# Patient Record
Sex: FEMALE | Race: OTHER | Employment: FULL TIME | URBAN - METROPOLITAN AREA
[De-identification: names, ages, dates, MRNs, and addresses within clinical notes are randomized per-mention and may not be internally consistent; named-entity substitution may affect disease eponyms.]

---

## 2017-03-13 ENCOUNTER — HOSPITAL ENCOUNTER (OUTPATIENT)
Age: 32
Discharge: HOME | End: 2017-03-13
Attending: FAMILY MEDICINE
Payer: COMMERCIAL

## 2017-03-13 VITALS
HEART RATE: 65 BPM | SYSTOLIC BLOOD PRESSURE: 146 MMHG | DIASTOLIC BLOOD PRESSURE: 88 MMHG | RESPIRATION RATE: 18 BRPM | WEIGHT: 243 LBS | OXYGEN SATURATION: 98 % | HEIGHT: 62 IN | BODY MASS INDEX: 44.72 KG/M2 | TEMPERATURE: 98 F

## 2017-03-13 DIAGNOSIS — R07.0 PAIN IN THROAT: ICD-10-CM

## 2017-03-13 DIAGNOSIS — J06.9 VIRAL UPPER RESPIRATORY TRACT INFECTION: Primary | ICD-10-CM

## 2017-03-13 PROCEDURE — 99203 OFFICE O/P NEW LOW 30 MIN: CPT

## 2017-03-13 PROCEDURE — 87430 STREP A AG IA: CPT

## 2017-03-13 PROCEDURE — 99204 OFFICE O/P NEW MOD 45 MIN: CPT

## 2017-03-13 RX ORDER — CODEINE PHOSPHATE AND GUAIFENESIN 10; 100 MG/5ML; MG/5ML
10 SOLUTION ORAL NIGHTLY PRN
Qty: 118 ML | Refills: 0 | Status: SHIPPED | OUTPATIENT
Start: 2017-03-13

## 2017-03-13 NOTE — ED PROVIDER NOTES
Patient Seen in: [address]    History   Patient presents with:  Sore Throat    Stated Complaint: Sore Throat    HPI    Patient here with sore throat for 3 days. Pain with swallowing.  Was recently in [place] and exposed t frontal sinus pressure. NECK: supple, no cervical LAD  THROAT: MMM noted, post phaynx injected, tonsils are symmetrical with mild enlargement and erythema. No exudate.     LUNGS: b/l CTA, good air entry  CARDIO: RRR without murmur  EXTREMITIES: no cyanosi

## 2017-03-13 NOTE — ED INITIAL ASSESSMENT (HPI)
Reports since Friday has had sore throat. Since yesterday worse sore throat felt hot denies fevers.  Also reports congestion and sinus pressure

## 2017-03-14 LAB — S PYO AG THROAT QL: NEGATIVE

## 2020-08-17 ENCOUNTER — HOSPITAL (OUTPATIENT)
Dept: OTHER | Age: 35
End: 2020-08-17
Attending: FAMILY MEDICINE

## 2020-09-23 ENCOUNTER — HOSPITAL (OUTPATIENT)
Dept: OTHER | Age: 35
End: 2020-09-23
Attending: EMERGENCY MEDICINE

## 2020-09-23 PROCEDURE — 99283 EMERGENCY DEPT VISIT LOW MDM: CPT | Performed by: FAMILY MEDICINE

## 2021-04-02 ENCOUNTER — HOSPITAL ENCOUNTER (OUTPATIENT)
Dept: CT IMAGING | Age: 36
Discharge: HOME | End: 2021-04-02
Attending: PLASTIC SURGERY

## 2021-04-02 DIAGNOSIS — R10.9 ABDOMINAL PAIN: ICD-10-CM

## 2021-04-02 PROCEDURE — 74177 CT ABD & PELVIS W/CONTRAST: CPT

## 2021-04-02 PROCEDURE — 10002805 HB CONTRAST AGENT: Performed by: PLASTIC SURGERY

## 2021-04-02 RX ADMIN — IOHEXOL 100 ML: 300 INJECTION, SOLUTION INTRAVENOUS at 15:22

## 2021-04-26 ENCOUNTER — OFFICE VISIT (OUTPATIENT)
Dept: INTERNAL MEDICINE | Age: 36
End: 2021-04-26

## 2021-04-26 DIAGNOSIS — M79.89 SOFT TISSUE MASS: ICD-10-CM

## 2021-04-26 DIAGNOSIS — R10.11 RIGHT UPPER QUADRANT ABDOMINAL PAIN: ICD-10-CM

## 2021-04-26 DIAGNOSIS — Z00.00 HEALTH MAINTENANCE EXAMINATION: Primary | ICD-10-CM

## 2021-04-26 DIAGNOSIS — E66.01 OBESITY, MORBID, BMI 40.0-49.9 (CMD): ICD-10-CM

## 2021-04-26 PROCEDURE — 99385 PREV VISIT NEW AGE 18-39: CPT | Performed by: INTERNAL MEDICINE

## 2021-04-26 RX ORDER — PANTOPRAZOLE SODIUM 40 MG/1
40 TABLET, DELAYED RELEASE ORAL DAILY
COMMUNITY
Start: 2019-10-28

## 2021-04-26 RX ORDER — MELOXICAM 10 MG/1
CAPSULE ORAL
COMMUNITY

## 2021-04-26 RX ORDER — ESTRADIOL 0.5 MG/1
1 TABLET ORAL DAILY
COMMUNITY
Start: 2019-10-28 | End: 2022-02-24 | Stop reason: ALTCHOICE

## 2021-04-26 ASSESSMENT — PATIENT HEALTH QUESTIONNAIRE - PHQ9
1. LITTLE INTEREST OR PLEASURE IN DOING THINGS: NOT AT ALL
CLINICAL INTERPRETATION OF PHQ9 SCORE: NO FURTHER SCREENING NEEDED
SUM OF ALL RESPONSES TO PHQ9 QUESTIONS 1 AND 2: 0
SUM OF ALL RESPONSES TO PHQ9 QUESTIONS 1 AND 2: 0
CLINICAL INTERPRETATION OF PHQ2 SCORE: NO FURTHER SCREENING NEEDED
2. FEELING DOWN, DEPRESSED OR HOPELESS: NOT AT ALL

## 2021-04-26 ASSESSMENT — PAIN SCALES - GENERAL: PAINLEVEL: 0

## 2021-04-26 ASSESSMENT — ENCOUNTER SYMPTOMS
RESPIRATORY NEGATIVE: 1
CONSTITUTIONAL NEGATIVE: 1

## 2021-05-03 ENCOUNTER — LAB SERVICES (OUTPATIENT)
Dept: LAB | Age: 36
End: 2021-05-03

## 2021-05-03 DIAGNOSIS — R10.11 RIGHT UPPER QUADRANT ABDOMINAL PAIN: ICD-10-CM

## 2021-05-03 DIAGNOSIS — Z00.00 HEALTH MAINTENANCE EXAMINATION: ICD-10-CM

## 2021-05-03 LAB
ALBUMIN SERPL-MCNC: 3.3 G/DL (ref 3.6–5.1)
ALBUMIN/GLOB SERPL: 0.9 {RATIO} (ref 1–2.4)
ALP SERPL-CCNC: 71 UNITS/L (ref 45–117)
ALT SERPL-CCNC: 27 UNITS/L
ANION GAP SERPL CALC-SCNC: 13 MMOL/L (ref 10–20)
AST SERPL-CCNC: 15 UNITS/L
BASOPHILS # BLD: 0 K/MCL (ref 0–0.3)
BASOPHILS NFR BLD: 0 %
BILIRUB SERPL-MCNC: 0.3 MG/DL (ref 0.2–1)
BUN SERPL-MCNC: 23 MG/DL (ref 6–20)
BUN/CREAT SERPL: 31 (ref 7–25)
CALCIUM SERPL-MCNC: 8.6 MG/DL (ref 8.4–10.2)
CHLORIDE SERPL-SCNC: 105 MMOL/L (ref 98–107)
CHOLEST SERPL-MCNC: 186 MG/DL
CHOLEST/HDLC SERPL: 3.2 {RATIO}
CO2 SERPL-SCNC: 27 MMOL/L (ref 21–32)
CREAT SERPL-MCNC: 0.75 MG/DL (ref 0.51–0.95)
DEPRECATED RDW RBC: 48 FL (ref 39–50)
EOSINOPHIL # BLD: 0.1 K/MCL (ref 0–0.5)
EOSINOPHIL NFR BLD: 2 %
ERYTHROCYTE [DISTWIDTH] IN BLOOD: 17.9 % (ref 11–15)
FASTING DURATION TIME PATIENT: ABNORMAL H
FASTING DURATION TIME PATIENT: NORMAL H
GFR SERPLBLD BASED ON 1.73 SQ M-ARVRAT: >90 ML/MIN/1.73M2
GLOBULIN SER-MCNC: 3.8 G/DL (ref 2–4)
GLUCOSE SERPL-MCNC: 85 MG/DL (ref 65–99)
HBA1C MFR BLD: 5.4 % (ref 4.5–5.6)
HCT VFR BLD CALC: 39.3 % (ref 36–46.5)
HDLC SERPL-MCNC: 58 MG/DL
HGB BLD-MCNC: 11.8 G/DL (ref 12–15.5)
IMM GRANULOCYTES # BLD AUTO: 0 K/MCL (ref 0–0.2)
IMM GRANULOCYTES # BLD: 0 %
LDLC SERPL CALC-MCNC: 109 MG/DL
LYMPHOCYTES # BLD: 1.9 K/MCL (ref 1–4.8)
LYMPHOCYTES NFR BLD: 24 %
MCH RBC QN AUTO: 22.8 PG (ref 26–34)
MCHC RBC AUTO-ENTMCNC: 30 G/DL (ref 32–36.5)
MCV RBC AUTO: 75.9 FL (ref 78–100)
MONOCYTES # BLD: 0.5 K/MCL (ref 0.3–0.9)
MONOCYTES NFR BLD: 6 %
NEUTROPHILS # BLD: 5.6 K/MCL (ref 1.8–7.7)
NEUTROPHILS NFR BLD: 68 %
NONHDLC SERPL-MCNC: 128 MG/DL
NRBC BLD MANUAL-RTO: 0 /100 WBC
PLATELET # BLD AUTO: 288 K/MCL (ref 140–450)
POTASSIUM SERPL-SCNC: 4.4 MMOL/L (ref 3.4–5.1)
PROT SERPL-MCNC: 7.1 G/DL (ref 6.4–8.2)
RBC # BLD: 5.18 MIL/MCL (ref 4–5.2)
SODIUM SERPL-SCNC: 141 MMOL/L (ref 135–145)
TRIGL SERPL-MCNC: 93 MG/DL
TSH SERPL-ACNC: 1.71 MCUNITS/ML (ref 0.35–5)
WBC # BLD: 8.2 K/MCL (ref 4.2–11)

## 2021-05-03 PROCEDURE — 36415 COLL VENOUS BLD VENIPUNCTURE: CPT | Performed by: INTERNAL MEDICINE

## 2021-05-03 PROCEDURE — 80050 GENERAL HEALTH PANEL: CPT | Performed by: INTERNAL MEDICINE

## 2021-05-03 PROCEDURE — 80061 LIPID PANEL: CPT | Performed by: INTERNAL MEDICINE

## 2021-05-03 PROCEDURE — 83036 HEMOGLOBIN GLYCOSYLATED A1C: CPT | Performed by: INTERNAL MEDICINE

## 2021-05-06 ENCOUNTER — HOSPITAL ENCOUNTER (OUTPATIENT)
Dept: ULTRASOUND IMAGING | Age: 36
Discharge: HOME | End: 2021-05-06
Attending: INTERNAL MEDICINE

## 2021-05-06 DIAGNOSIS — M79.89 SOFT TISSUE MASS: ICD-10-CM

## 2021-05-06 PROCEDURE — 76705 ECHO EXAM OF ABDOMEN: CPT

## 2021-05-25 VITALS
WEIGHT: 245 LBS | DIASTOLIC BLOOD PRESSURE: 73 MMHG | HEIGHT: 62 IN | BODY MASS INDEX: 45.08 KG/M2 | RESPIRATION RATE: 18 BRPM | TEMPERATURE: 97.9 F | HEART RATE: 69 BPM | SYSTOLIC BLOOD PRESSURE: 124 MMHG

## 2021-10-14 ENCOUNTER — TELEPHONE (OUTPATIENT)
Dept: SCHEDULING | Age: 36
End: 2021-10-14

## 2021-12-09 ENCOUNTER — OFFICE VISIT (OUTPATIENT)
Dept: INTERNAL MEDICINE | Age: 36
End: 2021-12-09

## 2021-12-09 VITALS
WEIGHT: 238 LBS | TEMPERATURE: 98.1 F | BODY MASS INDEX: 43.79 KG/M2 | SYSTOLIC BLOOD PRESSURE: 116 MMHG | DIASTOLIC BLOOD PRESSURE: 78 MMHG | OXYGEN SATURATION: 97 % | RESPIRATION RATE: 18 BRPM | HEART RATE: 70 BPM | HEIGHT: 62 IN

## 2021-12-09 DIAGNOSIS — R60.9 EDEMA, UNSPECIFIED TYPE: Primary | ICD-10-CM

## 2021-12-09 DIAGNOSIS — L30.9 DERMATITIS: ICD-10-CM

## 2021-12-09 PROCEDURE — 99214 OFFICE O/P EST MOD 30 MIN: CPT | Performed by: INTERNAL MEDICINE

## 2021-12-09 RX ORDER — ESTRADIOL 1 MG/1
1 TABLET ORAL DAILY
COMMUNITY
Start: 2021-09-11

## 2021-12-09 RX ORDER — PHENTERMINE HYDROCHLORIDE 37.5 MG/1
CAPSULE ORAL
COMMUNITY
Start: 2021-11-01

## 2021-12-09 RX ORDER — TRIAMCINOLONE ACETONIDE 1 MG/G
CREAM TOPICAL 2 TIMES DAILY
Qty: 30 G | Refills: 1 | Status: SHIPPED | OUTPATIENT
Start: 2021-12-09

## 2021-12-09 RX ORDER — HYDROCHLOROTHIAZIDE 12.5 MG/1
TABLET ORAL
Qty: 30 TABLET | Refills: 3 | Status: SHIPPED | OUTPATIENT
Start: 2021-12-09

## 2021-12-09 ASSESSMENT — PATIENT HEALTH QUESTIONNAIRE - PHQ9
2. FEELING DOWN, DEPRESSED OR HOPELESS: NOT AT ALL
SUM OF ALL RESPONSES TO PHQ9 QUESTIONS 1 AND 2: 0
1. LITTLE INTEREST OR PLEASURE IN DOING THINGS: NOT AT ALL
CLINICAL INTERPRETATION OF PHQ2 SCORE: NO FURTHER SCREENING NEEDED
SUM OF ALL RESPONSES TO PHQ9 QUESTIONS 1 AND 2: 0

## 2021-12-13 ASSESSMENT — ENCOUNTER SYMPTOMS
CONSTITUTIONAL NEGATIVE: 1
RESPIRATORY NEGATIVE: 1
GASTROINTESTINAL NEGATIVE: 1

## 2022-01-25 PROBLEM — M41.126 ADOLESCENT IDIOPATHIC SCOLIOSIS OF LUMBAR REGION: Status: ACTIVE | Noted: 2022-01-25

## 2022-01-25 PROBLEM — M54.16 LEFT LUMBAR RADICULOPATHY: Status: ACTIVE | Noted: 2022-01-25

## 2022-01-25 PROBLEM — M21.70 LEG LENGTH DISCREPANCY: Status: ACTIVE | Noted: 2022-01-25

## 2022-01-25 PROBLEM — M79.18 MYALGIA, OTHER SITE: Status: ACTIVE | Noted: 2022-01-25

## 2022-02-07 ENCOUNTER — TELEPHONE (OUTPATIENT)
Dept: TELEHEALTH | Age: 37
End: 2022-02-07

## 2022-02-07 ENCOUNTER — V-VISIT (OUTPATIENT)
Dept: FAMILY MEDICINE | Age: 37
End: 2022-02-07

## 2022-02-07 ENCOUNTER — APPOINTMENT (OUTPATIENT)
Dept: GENERAL RADIOLOGY | Age: 37
End: 2022-02-07
Attending: EMERGENCY MEDICINE

## 2022-02-07 ENCOUNTER — HOSPITAL ENCOUNTER (EMERGENCY)
Age: 37
Discharge: HOME | End: 2022-02-07
Attending: EMERGENCY MEDICINE

## 2022-02-07 VITALS
OXYGEN SATURATION: 100 % | RESPIRATION RATE: 16 BRPM | HEIGHT: 62 IN | TEMPERATURE: 97.9 F | DIASTOLIC BLOOD PRESSURE: 70 MMHG | SYSTOLIC BLOOD PRESSURE: 118 MMHG | WEIGHT: 240 LBS | BODY MASS INDEX: 44.16 KG/M2 | HEART RATE: 86 BPM

## 2022-02-07 DIAGNOSIS — R69 DIAGNOSIS DEFERRED: Primary | ICD-10-CM

## 2022-02-07 DIAGNOSIS — R42 VERTIGO: Primary | ICD-10-CM

## 2022-02-07 PROBLEM — M19.90 ARTHRITIS: Status: ACTIVE | Noted: 2022-02-07

## 2022-02-07 PROBLEM — E66.01 MORBID OBESITY (CMD): Status: ACTIVE | Noted: 2022-02-07

## 2022-02-07 LAB
ALBUMIN SERPL-MCNC: 3.5 G/DL (ref 3.6–5.1)
ALBUMIN/GLOB SERPL: 0.9 {RATIO} (ref 1–2.4)
ALP SERPL-CCNC: 83 UNITS/L (ref 45–117)
ALT SERPL-CCNC: 27 UNITS/L
ANION GAP SERPL CALC-SCNC: 13 MMOL/L (ref 10–20)
AST SERPL-CCNC: 15 UNITS/L
ATRIAL RATE (BPM): 75
BASOPHILS # BLD: 0 K/MCL (ref 0–0.3)
BASOPHILS NFR BLD: 0 %
BILIRUB SERPL-MCNC: 0.2 MG/DL (ref 0.2–1)
BUN SERPL-MCNC: 18 MG/DL (ref 6–20)
BUN/CREAT SERPL: 25 (ref 7–25)
CALCIUM SERPL-MCNC: 9.2 MG/DL (ref 8.4–10.2)
CHLORIDE SERPL-SCNC: 103 MMOL/L (ref 98–107)
CO2 SERPL-SCNC: 28 MMOL/L (ref 21–32)
CREAT SERPL-MCNC: 0.73 MG/DL (ref 0.51–0.95)
DEPRECATED RDW RBC: 39.2 FL (ref 39–50)
EOSINOPHIL # BLD: 0.1 K/MCL (ref 0–0.5)
EOSINOPHIL NFR BLD: 1 %
ERYTHROCYTE [DISTWIDTH] IN BLOOD: 12.9 % (ref 11–15)
FASTING DURATION TIME PATIENT: ABNORMAL H
FLUAV RNA RESP QL NAA+PROBE: NOT DETECTED
FLUBV RNA RESP QL NAA+PROBE: NOT DETECTED
GFR SERPLBLD BASED ON 1.73 SQ M-ARVRAT: >90 ML/MIN
GLOBULIN SER-MCNC: 3.8 G/DL (ref 2–4)
GLUCOSE SERPL-MCNC: 81 MG/DL (ref 70–99)
HCT VFR BLD CALC: 43.1 % (ref 36–46.5)
HGB BLD-MCNC: 13.9 G/DL (ref 12–15.5)
IMM GRANULOCYTES # BLD AUTO: 0 K/MCL (ref 0–0.2)
IMM GRANULOCYTES # BLD: 0 %
LYMPHOCYTES # BLD: 1.9 K/MCL (ref 1–4.8)
LYMPHOCYTES NFR BLD: 31 %
MCH RBC QN AUTO: 27.1 PG (ref 26–34)
MCHC RBC AUTO-ENTMCNC: 32.3 G/DL (ref 32–36.5)
MCV RBC AUTO: 84.2 FL (ref 78–100)
MONOCYTES # BLD: 0.3 K/MCL (ref 0.3–0.9)
MONOCYTES NFR BLD: 6 %
NEUTROPHILS # BLD: 3.9 K/MCL (ref 1.8–7.7)
NEUTROPHILS NFR BLD: 62 %
NRBC BLD MANUAL-RTO: 0 /100 WBC
P AXIS (DEGREES): 61
PLATELET # BLD AUTO: 287 K/MCL (ref 140–450)
POTASSIUM SERPL-SCNC: 4.1 MMOL/L (ref 3.4–5.1)
PR-INTERVAL (MSEC): 160
PROT SERPL-MCNC: 7.3 G/DL (ref 6.4–8.2)
QRS-INTERVAL (MSEC): 98
QT-INTERVAL (MSEC): 376
QTC: 420
R AXIS (DEGREES): 48
RBC # BLD: 5.12 MIL/MCL (ref 4–5.2)
REPORT TEXT: NORMAL
RSV AG NPH QL IA.RAPID: NOT DETECTED
SARS-COV-2 RNA RESP QL NAA+PROBE: NOT DETECTED
SERVICE CMNT-IMP: NORMAL
SERVICE CMNT-IMP: NORMAL
SODIUM SERPL-SCNC: 140 MMOL/L (ref 135–145)
T AXIS (DEGREES): 26
TROPONIN I SERPL DL<=0.01 NG/ML-MCNC: 6 NG/L
TROPONIN I SERPL DL<=0.01 NG/ML-MCNC: 7 NG/L
VENTRICULAR RATE EKG/MIN (BPM): 75
WBC # BLD: 6.2 K/MCL (ref 4.2–11)

## 2022-02-07 PROCEDURE — 84484 ASSAY OF TROPONIN QUANT: CPT | Performed by: EMERGENCY MEDICINE

## 2022-02-07 PROCEDURE — C9803 HOPD COVID-19 SPEC COLLECT: HCPCS

## 2022-02-07 PROCEDURE — 99285 EMERGENCY DEPT VISIT HI MDM: CPT | Performed by: EMERGENCY MEDICINE

## 2022-02-07 PROCEDURE — 71046 X-RAY EXAM CHEST 2 VIEWS: CPT

## 2022-02-07 PROCEDURE — 0241U COVID/FLU/RSV PANEL: CPT | Performed by: EMERGENCY MEDICINE

## 2022-02-07 PROCEDURE — 10002803 HB RX 637: Performed by: EMERGENCY MEDICINE

## 2022-02-07 PROCEDURE — 93005 ELECTROCARDIOGRAM TRACING: CPT | Performed by: EMERGENCY MEDICINE

## 2022-02-07 PROCEDURE — 93010 ELECTROCARDIOGRAM REPORT: CPT | Performed by: INTERNAL MEDICINE

## 2022-02-07 PROCEDURE — 85025 COMPLETE CBC W/AUTO DIFF WBC: CPT | Performed by: EMERGENCY MEDICINE

## 2022-02-07 PROCEDURE — 80053 COMPREHEN METABOLIC PANEL: CPT | Performed by: EMERGENCY MEDICINE

## 2022-02-07 PROCEDURE — 36415 COLL VENOUS BLD VENIPUNCTURE: CPT

## 2022-02-07 PROCEDURE — 99284 EMERGENCY DEPT VISIT MOD MDM: CPT

## 2022-02-07 RX ORDER — MECLIZINE HYDROCHLORIDE 25 MG/1
25 TABLET ORAL ONCE
Status: COMPLETED | OUTPATIENT
Start: 2022-02-07 | End: 2022-02-07

## 2022-02-07 RX ORDER — MECLIZINE HYDROCHLORIDE 25 MG/1
25 TABLET ORAL 3 TIMES DAILY PRN
Qty: 15 TABLET | Refills: 0 | Status: SHIPPED | OUTPATIENT
Start: 2022-02-07 | End: 2022-02-19 | Stop reason: SDUPTHER

## 2022-02-07 RX ADMIN — MECLIZINE HYDROCHLORIDE 25 MG: 25 TABLET ORAL at 20:38

## 2022-02-07 ASSESSMENT — ENCOUNTER SYMPTOMS
WEAKNESS: 0
EYE REDNESS: 0
CHILLS: 0
EYE PAIN: 0
SHORTNESS OF BREATH: 0
HEADACHES: 1
BRUISES/BLEEDS EASILY: 0
FEVER: 0
FATIGUE: 0
VOMITING: 0
SPEECH DIFFICULTY: 0
SORE THROAT: 0
BACK PAIN: 0
POLYDIPSIA: 0
CONSTIPATION: 0
DIAPHORESIS: 0
ABDOMINAL PAIN: 0
COUGH: 0
NUMBNESS: 0
NAUSEA: 0
DIZZINESS: 1
DIARRHEA: 0
RHINORRHEA: 0
TREMORS: 0

## 2022-02-07 ASSESSMENT — PAIN SCALES - GENERAL
PAINLEVEL_OUTOF10: 0
PAINLEVEL_OUTOF10: 5

## 2022-02-19 ENCOUNTER — OFFICE VISIT (OUTPATIENT)
Dept: URGENT CARE | Age: 37
End: 2022-02-19

## 2022-02-19 VITALS
TEMPERATURE: 97.9 F | DIASTOLIC BLOOD PRESSURE: 93 MMHG | HEART RATE: 99 BPM | RESPIRATION RATE: 20 BRPM | SYSTOLIC BLOOD PRESSURE: 131 MMHG | OXYGEN SATURATION: 98 %

## 2022-02-19 DIAGNOSIS — R42 VERTIGO: ICD-10-CM

## 2022-02-19 DIAGNOSIS — S00.411A ABRASION OF RIGHT EAR CANAL, INITIAL ENCOUNTER: Primary | ICD-10-CM

## 2022-02-19 DIAGNOSIS — H61.21 IMPACTED CERUMEN OF RIGHT EAR: ICD-10-CM

## 2022-02-19 PROCEDURE — 99213 OFFICE O/P EST LOW 20 MIN: CPT | Performed by: NURSE PRACTITIONER

## 2022-02-19 RX ORDER — OFLOXACIN 3 MG/ML
10 SOLUTION AURICULAR (OTIC) 2 TIMES DAILY
Qty: 7 ML | Refills: 0 | Status: SHIPPED | OUTPATIENT
Start: 2022-02-19 | End: 2022-02-26

## 2022-02-19 RX ORDER — MECLIZINE HYDROCHLORIDE 25 MG/1
25 TABLET ORAL 2 TIMES DAILY PRN
Qty: 10 TABLET | Refills: 0 | Status: SHIPPED | OUTPATIENT
Start: 2022-02-19 | End: 2022-02-24

## 2022-02-19 ASSESSMENT — PAIN SCALES - GENERAL: PAINLEVEL: 8

## 2022-02-19 ASSESSMENT — ENCOUNTER SYMPTOMS
CHILLS: 0
FATIGUE: 1
FEVER: 0
DIZZINESS: 1
WEAKNESS: 0
GASTROINTESTINAL NEGATIVE: 1
SORE THROAT: 0
RHINORRHEA: 0
TROUBLE SWALLOWING: 0
EYES NEGATIVE: 1
NUMBNESS: 0
RESPIRATORY NEGATIVE: 1
SPEECH DIFFICULTY: 0

## 2022-02-23 ENCOUNTER — OFFICE VISIT (OUTPATIENT)
Dept: INTERNAL MEDICINE | Age: 37
End: 2022-02-23

## 2022-02-23 VITALS
RESPIRATION RATE: 16 BRPM | SYSTOLIC BLOOD PRESSURE: 113 MMHG | TEMPERATURE: 98.2 F | DIASTOLIC BLOOD PRESSURE: 69 MMHG | HEART RATE: 87 BPM | OXYGEN SATURATION: 98 % | BODY MASS INDEX: 45.27 KG/M2 | WEIGHT: 246 LBS | HEIGHT: 62 IN

## 2022-02-23 DIAGNOSIS — J34.89 SINUS PRESSURE: ICD-10-CM

## 2022-02-23 DIAGNOSIS — K52.9 GASTROENTERITIS: ICD-10-CM

## 2022-02-23 DIAGNOSIS — H81.10 BENIGN PAROXYSMAL POSITIONAL VERTIGO, UNSPECIFIED LATERALITY: Primary | ICD-10-CM

## 2022-02-23 PROCEDURE — 99213 OFFICE O/P EST LOW 20 MIN: CPT | Performed by: INTERNAL MEDICINE

## 2022-02-23 RX ORDER — PSEUDOEPHEDRINE HCL 120 MG/1
120 TABLET, FILM COATED, EXTENDED RELEASE ORAL EVERY 12 HOURS
Qty: 30 TABLET | Refills: 0 | Status: SHIPPED | OUTPATIENT
Start: 2022-02-23

## 2022-02-23 ASSESSMENT — PATIENT HEALTH QUESTIONNAIRE - PHQ9
SUM OF ALL RESPONSES TO PHQ9 QUESTIONS 1 AND 2: 0
2. FEELING DOWN, DEPRESSED OR HOPELESS: NOT AT ALL
CLINICAL INTERPRETATION OF PHQ2 SCORE: NO FURTHER SCREENING NEEDED
SUM OF ALL RESPONSES TO PHQ9 QUESTIONS 1 AND 2: 0
1. LITTLE INTEREST OR PLEASURE IN DOING THINGS: NOT AT ALL

## 2022-02-23 ASSESSMENT — PAIN SCALES - GENERAL: PAINLEVEL: 6

## 2022-02-24 ASSESSMENT — ENCOUNTER SYMPTOMS
NAUSEA: 1
RESPIRATORY NEGATIVE: 1
SINUS PRESSURE: 1
BLOOD IN STOOL: 0
FEVER: 0
ABDOMINAL PAIN: 1
CONSTITUTIONAL NEGATIVE: 1

## 2022-03-03 ENCOUNTER — EXTERNAL RECORD (OUTPATIENT)
Dept: HEALTH INFORMATION MANAGEMENT | Facility: OTHER | Age: 37
End: 2022-03-03

## 2022-03-09 ENCOUNTER — EXTERNAL RECORD (OUTPATIENT)
Dept: HEALTH INFORMATION MANAGEMENT | Facility: OTHER | Age: 37
End: 2022-03-09

## 2022-03-18 ENCOUNTER — WALK IN (OUTPATIENT)
Dept: URGENT CARE | Age: 37
End: 2022-03-18

## 2022-03-18 VITALS
BODY MASS INDEX: 46.38 KG/M2 | RESPIRATION RATE: 20 BRPM | HEART RATE: 76 BPM | OXYGEN SATURATION: 97 % | HEIGHT: 62 IN | SYSTOLIC BLOOD PRESSURE: 108 MMHG | DIASTOLIC BLOOD PRESSURE: 77 MMHG | TEMPERATURE: 97.3 F | WEIGHT: 252 LBS

## 2022-03-18 DIAGNOSIS — M26.609 TMJ (TEMPOROMANDIBULAR JOINT DISORDER): Primary | ICD-10-CM

## 2022-03-18 DIAGNOSIS — M79.18 MYOFASCIAL PAIN: ICD-10-CM

## 2022-03-18 PROBLEM — M53.9 BACK DISORDER: Status: ACTIVE | Noted: 2022-03-18

## 2022-03-18 PROBLEM — G50.1 ATYPICAL FACIAL PAIN: Status: ACTIVE | Noted: 2022-03-18

## 2022-03-18 PROBLEM — M79.2 NEURALGIA: Status: ACTIVE | Noted: 2022-03-18

## 2022-03-18 PROBLEM — M21.70 LEG LENGTH DISCREPANCY: Status: ACTIVE | Noted: 2022-01-25

## 2022-03-18 PROBLEM — M41.126 ADOLESCENT IDIOPATHIC SCOLIOSIS OF LUMBAR REGION: Status: ACTIVE | Noted: 2022-01-25

## 2022-03-18 PROBLEM — M54.50 CHRONIC LOWER BACK PAIN: Status: ACTIVE | Noted: 2022-03-18

## 2022-03-18 PROBLEM — M41.9 SCOLIOSIS: Status: ACTIVE | Noted: 2022-03-18

## 2022-03-18 PROBLEM — G89.29 CHRONIC LOWER BACK PAIN: Status: ACTIVE | Noted: 2022-03-18

## 2022-03-18 PROBLEM — M54.16 LEFT LUMBAR RADICULOPATHY: Status: ACTIVE | Noted: 2022-01-25

## 2022-03-18 LAB
INTERNAL PROCEDURAL CONTROLS ACCEPTABLE: YES
S PYO AG THROAT QL IA.RAPID: NEGATIVE
SARS-COV+SARS-COV-2 AG RESP QL IA.RAPID: NOT DETECTED

## 2022-03-18 PROCEDURE — 87426 SARSCOV CORONAVIRUS AG IA: CPT | Performed by: NURSE PRACTITIONER

## 2022-03-18 PROCEDURE — 99213 OFFICE O/P EST LOW 20 MIN: CPT | Performed by: NURSE PRACTITIONER

## 2022-03-18 PROCEDURE — 87880 STREP A ASSAY W/OPTIC: CPT | Performed by: NURSE PRACTITIONER

## 2022-03-18 RX ORDER — CYCLOBENZAPRINE HCL 10 MG
10 TABLET ORAL
Qty: 30 TABLET | Refills: 2 | Status: SHIPPED | OUTPATIENT
Start: 2022-03-18

## 2022-03-18 ASSESSMENT — ENCOUNTER SYMPTOMS
SHORTNESS OF BREATH: 0
RHINORRHEA: 0
DIZZINESS: 0
LIGHT-HEADEDNESS: 0
NUMBNESS: 0
FACIAL SWELLING: 1
ABDOMINAL PAIN: 0
SORE THROAT: 0
ADENOPATHY: 0
VOICE CHANGE: 0
FATIGUE: 0
FEVER: 0
DIAPHORESIS: 0
FACIAL ASYMMETRY: 0
SINUS PRESSURE: 0
STRIDOR: 0
EYE PAIN: 0
CHILLS: 0
TROUBLE SWALLOWING: 0
HEADACHES: 0
SINUS PAIN: 0

## 2022-07-12 ENCOUNTER — APPOINTMENT (OUTPATIENT)
Dept: URGENT CARE | Age: 37
End: 2022-07-12

## 2022-07-12 ENCOUNTER — TELEPHONE (OUTPATIENT)
Dept: URGENT CARE | Age: 37
End: 2022-07-12

## 2022-09-30 ENCOUNTER — APPOINTMENT (OUTPATIENT)
Dept: URBAN - METROPOLITAN AREA CLINIC 317 | Age: 37
Setting detail: DERMATOLOGY
End: 2022-09-30

## 2022-09-30 DIAGNOSIS — L20.89 OTHER ATOPIC DERMATITIS: ICD-10-CM

## 2022-09-30 DIAGNOSIS — L65.9 NONSCARRING HAIR LOSS, UNSPECIFIED: ICD-10-CM

## 2022-09-30 PROBLEM — L20.84 INTRINSIC (ALLERGIC) ECZEMA: Status: ACTIVE | Noted: 2022-09-30

## 2022-09-30 PROCEDURE — OTHER ORDER TESTS: OTHER

## 2022-09-30 PROCEDURE — OTHER COUNSELING: OTHER

## 2022-09-30 PROCEDURE — 99204 OFFICE O/P NEW MOD 45 MIN: CPT

## 2022-09-30 PROCEDURE — OTHER PRESCRIPTION MEDICATION MANAGEMENT: OTHER

## 2022-09-30 PROCEDURE — OTHER PRESCRIPTION: OTHER

## 2022-09-30 PROCEDURE — OTHER MIPS QUALITY: OTHER

## 2022-09-30 RX ORDER — TRIAMCINOLONE ACETONIDE 1 MG/G
CREAM TOPICAL BID
Qty: 80 | Refills: 0 | Status: ERX | COMMUNITY
Start: 2022-09-30

## 2022-09-30 ASSESSMENT — LOCATION ZONE DERM
LOCATION ZONE: TRUNK
LOCATION ZONE: SCALP

## 2022-09-30 ASSESSMENT — LOCATION DETAILED DESCRIPTION DERM
LOCATION DETAILED: MIDDLE STERNUM
LOCATION DETAILED: RIGHT MEDIAL FRONTAL SCALP

## 2022-09-30 ASSESSMENT — LOCATION SIMPLE DESCRIPTION DERM
LOCATION SIMPLE: RIGHT SCALP
LOCATION SIMPLE: CHEST

## 2022-09-30 NOTE — HPI: RASH
How Severe Is Your Rash?: moderate
Is This A New Presentation, Or A Follow-Up?: Rash
Additional History: Patient states her PCP heave her a triamcinolone 0.1% cream that helped  sometimes with the itchy but the bumps did not go away.

## 2022-09-30 NOTE — PROCEDURE: COUNSELING
Detail Level: Detailed
Minoxidil 5% Topical Foam Recommendations: Apply to scalp twice daily. Application may be easier when hair is wet.
Detail Level: Zone

## 2022-09-30 NOTE — PROCEDURE: PRESCRIPTION MEDICATION MANAGEMENT
Render In Strict Bullet Format?: No
Continue Regimen: Triamcinolone 0.1%cream twice a day x 2 weeks with a 1 week break
Detail Level: Zone

## 2023-03-04 ENCOUNTER — OFFICE VISIT (OUTPATIENT)
Dept: URGENT CARE | Age: 38
End: 2023-03-04

## 2023-03-04 VITALS
OXYGEN SATURATION: 97 % | TEMPERATURE: 97.8 F | HEART RATE: 82 BPM | WEIGHT: 250 LBS | SYSTOLIC BLOOD PRESSURE: 130 MMHG | BODY MASS INDEX: 46.01 KG/M2 | RESPIRATION RATE: 18 BRPM | DIASTOLIC BLOOD PRESSURE: 75 MMHG | HEIGHT: 62 IN

## 2023-03-04 DIAGNOSIS — U07.1 COVID-19 VIRUS INFECTION: Primary | ICD-10-CM

## 2023-03-04 LAB
INTERNAL PROCEDURAL CONTROLS ACCEPTABLE: YES
INTERNAL PROCEDURAL CONTROLS ACCEPTABLE: YES
S PYO AG THROAT QL IA.RAPID: NEGATIVE
SARS-COV+SARS-COV-2 AG RESP QL IA.RAPID: DETECTED

## 2023-03-04 PROCEDURE — 87880 STREP A ASSAY W/OPTIC: CPT | Performed by: NURSE PRACTITIONER

## 2023-03-04 PROCEDURE — 99213 OFFICE O/P EST LOW 20 MIN: CPT | Performed by: NURSE PRACTITIONER

## 2023-03-04 PROCEDURE — 87426 SARSCOV CORONAVIRUS AG IA: CPT | Performed by: NURSE PRACTITIONER

## 2023-03-04 RX ORDER — NIRMATRELVIR AND RITONAVIR 300-100 MG
KIT ORAL
Qty: 30 EACH | Refills: 0 | Status: SHIPPED | OUTPATIENT
Start: 2023-03-04

## 2023-03-04 RX ORDER — PANTOPRAZOLE SODIUM 40 MG/1
TABLET, DELAYED RELEASE ORAL EVERY 24 HOURS
COMMUNITY
End: 2023-03-04 | Stop reason: SDUPTHER

## 2023-03-04 RX ORDER — ESTRADIOL 1 MG/1
TABLET ORAL EVERY 24 HOURS
COMMUNITY
End: 2023-03-04 | Stop reason: SDUPTHER

## 2023-03-04 ASSESSMENT — ENCOUNTER SYMPTOMS
GASTROINTESTINAL NEGATIVE: 1
SORE THROAT: 1
SHORTNESS OF BREATH: 0
COUGH: 1
TROUBLE SWALLOWING: 0
WHEEZING: 0
CHILLS: 0
FEVER: 0
FATIGUE: 1

## 2023-04-08 ENCOUNTER — WALK IN (OUTPATIENT)
Dept: URGENT CARE | Age: 38
End: 2023-04-08

## 2023-04-08 VITALS
DIASTOLIC BLOOD PRESSURE: 78 MMHG | RESPIRATION RATE: 16 BRPM | TEMPERATURE: 98.2 F | OXYGEN SATURATION: 97 % | HEART RATE: 76 BPM | SYSTOLIC BLOOD PRESSURE: 132 MMHG

## 2023-04-08 DIAGNOSIS — R22.41 MASS OF THIGH, RIGHT: Primary | ICD-10-CM

## 2023-04-08 PROCEDURE — 99213 OFFICE O/P EST LOW 20 MIN: CPT | Performed by: NURSE PRACTITIONER

## 2023-04-08 ASSESSMENT — ENCOUNTER SYMPTOMS
VOMITING: 0
CHILLS: 0
ABDOMINAL PAIN: 0
NAUSEA: 0
SHORTNESS OF BREATH: 0
DIARRHEA: 0
FEVER: 0
CONSTIPATION: 0
WHEEZING: 0
DIAPHORESIS: 0

## 2023-04-11 ENCOUNTER — TELEPHONE (OUTPATIENT)
Dept: URGENT CARE | Age: 38
End: 2023-04-11

## 2023-04-27 ENCOUNTER — NURSE TRIAGE (OUTPATIENT)
Dept: TELEHEALTH | Age: 38
End: 2023-04-27

## 2023-07-17 ENCOUNTER — MED REC SCAN ONLY (OUTPATIENT)
Dept: OBGYN CLINIC | Facility: CLINIC | Age: 38
End: 2023-07-17

## 2023-11-09 RX ORDER — ESTRADIOL 1 MG/1
1 TABLET ORAL DAILY
Qty: 90 TABLET | Refills: 0 | Status: CANCELLED | OUTPATIENT
Start: 2023-11-09

## 2023-11-09 NOTE — TELEPHONE ENCOUNTER
The pt has not been seen in the office. Please have her make appt.  I have appointments available daily

## 2023-11-09 NOTE — TELEPHONE ENCOUNTER
Patient verified name and     Aware of recommendations and agreed. Scheduled tomorrow with JF.  Aware of scheduling details

## 2023-11-10 ENCOUNTER — OFFICE VISIT (OUTPATIENT)
Dept: OBGYN CLINIC | Facility: CLINIC | Age: 38
End: 2023-11-10

## 2023-11-10 VITALS
HEIGHT: 62 IN | SYSTOLIC BLOOD PRESSURE: 153 MMHG | HEART RATE: 77 BPM | DIASTOLIC BLOOD PRESSURE: 110 MMHG | BODY MASS INDEX: 49.96 KG/M2 | WEIGHT: 271.5 LBS

## 2023-11-10 DIAGNOSIS — Z01.419 NORMAL GYNECOLOGIC EXAMINATION: Primary | ICD-10-CM

## 2023-11-10 DIAGNOSIS — Z12.31 BREAST CANCER SCREENING BY MAMMOGRAM: ICD-10-CM

## 2023-11-10 RX ORDER — ESTRADIOL 1 MG/1
1 TABLET ORAL DAILY
Qty: 90 TABLET | Refills: 4 | Status: SHIPPED | OUTPATIENT
Start: 2023-11-10

## 2023-11-10 RX ORDER — PANTOPRAZOLE SODIUM 40 MG/1
1 TABLET, DELAYED RELEASE ORAL EVERY MORNING
COMMUNITY
Start: 2022-08-14

## 2023-11-10 NOTE — PROGRESS NOTES
Jesi Guzman is a 43year old female  No LMP recorded (lmp unknown). Patient has had a hysterectomy. Chief Complaint   Patient presents with    Annual     Pt needs refill on Estradiol   Pt sexually active. Pt had hysterectomy for endometriosis. Pt had laparoscopic tlh/bso for endometriosis. She takes estradiol 1 mg daily for hot flushes. OBSTETRICS HISTORY:     OB History    Para Term  AB Living   0 0 0 0 0 0   SAB IAB Ectopic Multiple Live Births   0 0 0 0 0       GYNE HISTORY:     Pap Date: 06/15/22  Pap Result Notes: Normal   Period Cycle (Days): Hysterectomy (11/10/2023  9:46 AM)  Use of Birth Control (if yes, specify type): None (11/10/2023  9:46 AM)  Pap Date: 06/15/22 (11/10/2023  9:46 AM)  Pap Result Notes: Normal (11/10/2023  9:46 AM)         No data to display                  MEDICAL HISTORY:     History reviewed. No pertinent past medical history.     SURGICAL HISTORY:     Past Surgical History:   Procedure Laterality Date    CHOLECYSTECTOMY      Sx Hx taken by CECILE    CYST REMOVAL Bilateral 2018    Left endometrioma, adhesions    HYSTERECTOMY  2019    BSO Dr. Heather Jack w/path showing bilateral endometriomas, adhesions present    LAP Kimberley Emerson -Dr Nate Garcia, Sx Hx taken by CECILE    OTHER SURGICAL HISTORY  2020    Panacolectomy       SOCIAL HISTORY:     Social History     Socioeconomic History    Marital status: Single   Tobacco Use    Smoking status: Never    Smokeless tobacco: Never   Substance and Sexual Activity    Alcohol use: Yes     Comment: occ    Drug use: Never        FAMILY HISTORY:     Family History   Problem Relation Age of Onset    Diabetes Father     Thyroid Disorder Father     Diabetes Mother     Breast Cancer Mother     Heart Disease Mother     Hypertension Mother        MEDICATIONS:       Current Outpatient Medications:     Iron Carbonyl-Vitamin C-FOS 30-10-25 MG Oral Chew Tab, as directed Orally, Disp: , Rfl: Multiple Vitamins-Minerals (WOMENS MULTIVITAMIN) Oral Tab, as directed Orally, Disp: , Rfl:     pantoprazole 40 MG Oral Tab EC, Take 1 tablet (40 mg total) by mouth every morning., Disp: , Rfl:     guaiFENesin-codeine (CHERATUSSIN AC) 100-10 MG/5ML Oral Solution, Take 10 mL by mouth nightly as needed for cough. (Patient not taking: Reported on 11/10/2023), Disp: 118 mL, Rfl: 0    ALLERGIES:     No Known Allergies      REVIEW OF SYSTEMS:     Constitutional:    denies fever / chills  Eyes:     denies blurred or double vision  Cardiovascular:  denies chest pain or palpitations  Respiratory:    denies shortness of breath  Gastrointestinal:  denies severe abdominal pain, frequent diarrhea or constipation, nausea / vomiting  Genitourinary:    denies dysuria, bothersome incontinence  Skin/Breast:   denies any breast pain, lumps, or discharge  Neurological:    denies frequent severe headaches  Psychiatric:   denies depression or anxiety, thoughts of harming self or others  Heme/Lymph:    denies easy bruising or bleeding      PHYSICAL EXAM:   Blood pressure (!) 153/110, pulse 77, height 5' 2\" (1.575 m), weight 271 lb 8 oz (123.2 kg). Constitutional:  well developed, well nourished  Head/Face:  normocephalic  Neck/Thyroid: thyroid symmetric, no thyromegaly, no nodules, no adenopathy  Lymphatic: no abnormal supraclavicular or axillary adenopathy is noted  Breast:   normal without palpable masses, tenderness, asymmetry, nipple discharge, nipple retraction or skin changes  Abdomen:   soft, nontender, nondistended, no masses  Skin/Hair:  no unusual rashes or bruises  Extremities:  no edema, no cyanosis, non tender bilaterally  Psychiatric:   oriented to time, place, person and situation.  Appropriate mood and affect    Pelvic Exam:  External Genitalia:  normal appearance, hair distribution, and no lesions  Urethral Meatus:   normal in size, location, without lesions   Bladder:    no fullness, masses or tenderness  Vagina: normal appearance without lesions, no abnormal discharge  Sve: no pelvic masses      ASSESSMENT & PLAN:     There are no diagnoses linked to this encounter. FOLLOW-UP     No follow-ups on file.       Kellen Sapp MD  11/10/2023

## 2023-11-15 LAB — HPV I/H RISK 1 DNA SPEC QL NAA+PROBE: NEGATIVE

## 2023-12-18 ENCOUNTER — APPOINTMENT (OUTPATIENT)
Dept: CARDIOLOGY | Age: 38
End: 2023-12-18

## 2024-01-02 ENCOUNTER — TELEPHONE (OUTPATIENT)
Dept: CARDIOLOGY | Age: 39
End: 2024-01-02

## 2024-01-02 ENCOUNTER — APPOINTMENT (OUTPATIENT)
Dept: CARDIOLOGY | Age: 39
End: 2024-01-02

## 2024-01-02 VITALS
SYSTOLIC BLOOD PRESSURE: 123 MMHG | DIASTOLIC BLOOD PRESSURE: 74 MMHG | OXYGEN SATURATION: 96 % | BODY MASS INDEX: 49.24 KG/M2 | WEIGHT: 269.2 LBS | HEART RATE: 76 BPM

## 2024-01-02 DIAGNOSIS — E66.01 MORBID OBESITY (CMD): Primary | ICD-10-CM

## 2024-01-02 DIAGNOSIS — R94.31 ABNORMAL ELECTROCARDIOGRAM (ECG) (EKG): ICD-10-CM

## 2024-01-02 DIAGNOSIS — Z01.818 PREOPERATIVE CLEARANCE: ICD-10-CM

## 2024-01-02 PROCEDURE — 93000 ELECTROCARDIOGRAM COMPLETE: CPT | Performed by: INTERNAL MEDICINE

## 2024-01-02 PROCEDURE — 99203 OFFICE O/P NEW LOW 30 MIN: CPT | Performed by: INTERNAL MEDICINE

## 2024-01-02 ASSESSMENT — ENCOUNTER SYMPTOMS
SHORTNESS OF BREATH: 0
ADENOPATHY: 0
EYE PAIN: 0
ABDOMINAL PAIN: 0
FEVER: 0
CHILLS: 0
DIZZINESS: 0
BACK PAIN: 0
VOMITING: 0
SORE THROAT: 0
SLEEP DISTURBANCE: 0
COUGH: 0
WHEEZING: 0
BLOOD IN STOOL: 0
POLYDIPSIA: 0
DIARRHEA: 0
UNEXPECTED WEIGHT CHANGE: 0
SEIZURES: 0

## 2024-01-02 ASSESSMENT — PATIENT HEALTH QUESTIONNAIRE - PHQ9
CLINICAL INTERPRETATION OF PHQ2 SCORE: NO FURTHER SCREENING NEEDED
SUM OF ALL RESPONSES TO PHQ9 QUESTIONS 1 AND 2: 0
SUM OF ALL RESPONSES TO PHQ9 QUESTIONS 1 AND 2: 0
2. FEELING DOWN, DEPRESSED OR HOPELESS: NOT AT ALL
1. LITTLE INTEREST OR PLEASURE IN DOING THINGS: NOT AT ALL

## 2024-01-08 ENCOUNTER — APPOINTMENT (OUTPATIENT)
Dept: CARDIOLOGY | Age: 39
End: 2024-01-08
Attending: INTERNAL MEDICINE

## 2024-01-08 VITALS
SYSTOLIC BLOOD PRESSURE: 132 MMHG | WEIGHT: 269.18 LBS | BODY MASS INDEX: 49.54 KG/M2 | HEIGHT: 62 IN | DIASTOLIC BLOOD PRESSURE: 81 MMHG

## 2024-01-08 DIAGNOSIS — Z01.818 PREOPERATIVE CLEARANCE: ICD-10-CM

## 2024-01-08 DIAGNOSIS — R94.31 ABNORMAL ELECTROCARDIOGRAM (ECG) (EKG): ICD-10-CM

## 2024-01-08 LAB
AORTIC VALVE AREA (AVA): 1.05
AORTIC VALVE AREA: 1.93
AV MEAN GRADIENT (AVMG): 6
AV MEAN VELOCITY (AVMV): 1.14
AV PEAK GRADIENT (AVPG): 10
AV PEAK VELOCITY (AVPV): 1.58
AV STENOSIS SEVERITY TEXT: NORMAL
AVI LVOT PEAK GRADIENT (LVOTMG): 1.1
E WAVE DECELARATION TIME (MDT): 10.1
HEART RATE RESERVE PREDICTED: 9.39 BPM
INTERVENTRICULAR SEPTUM IN END DIASTOLE (IVSD): 2.09
LEFT INTERNAL DIMENSION IN SYSTOLE (LVSD): 1
LEFT VENTRICULAR INTERNAL DIMENSION IN DIASTOLE (LVDD): 3.4
LEFT VENTRICULAR POSTERIOR WALL IN END DIASTOLE (LVPW): 4.8
LV EF: NORMAL %
LVOT 2D (LVOTD): 27.3
LVOT VTI (LVOTVTI): 1.2
MV E TISSUE VEL LAT (MELV): 0.98
MV E TISSUE VEL MED (MESV): 21.2
MV E WAVE VEL/E TISSUE VEL MED(MSR): 10.4
MV PEAK A VELOCITY (MVPAV): 222
MV PEAK E VELOCITY (MVPEV): 0.6
RESTING HR ACHIEVED: 72 BPM
RV END SYSTOLIC LONGITUDINAL STRAIN FREE WALL (RVGS): 1.8
STRESS BASELINE BP: NORMAL MMHG
STRESS O2 SAT REST: 96 %
STRESS PEAK HR: 164 BPM
STRESS PERCENT HR: 91 %
STRESS POST ESTIMATED WORKLOAD: 7 METS
STRESS POST EXERCISE DUR MIN: 6 MIN
STRESS POST EXERCISE DUR SEC: 2 SEC
STRESS POST O2 SAT PEAK: 97 %
STRESS POST PEAK BP: NORMAL MMHG
STRESS TARGET HR: 181 BPM
TV ESTIMATED RIGHT ARTERIAL PRESSURE (RAP): 11.7

## 2024-01-08 PROCEDURE — 93015 CV STRESS TEST SUPVJ I&R: CPT | Performed by: INTERNAL MEDICINE

## 2024-01-08 PROCEDURE — 93306 TTE W/DOPPLER COMPLETE: CPT | Performed by: INTERNAL MEDICINE

## 2024-01-09 ENCOUNTER — TELEPHONE (OUTPATIENT)
Dept: CARDIOLOGY | Age: 39
End: 2024-01-09

## 2024-09-16 ENCOUNTER — OFFICE VISIT (OUTPATIENT)
Dept: SURGERY | Facility: CLINIC | Age: 39
End: 2024-09-16
Payer: COMMERCIAL

## 2024-09-16 VITALS — HEART RATE: 72 BPM | DIASTOLIC BLOOD PRESSURE: 76 MMHG | SYSTOLIC BLOOD PRESSURE: 124 MMHG

## 2024-09-16 DIAGNOSIS — H93.A9 PULSATILE TINNITUS: Primary | ICD-10-CM

## 2024-09-16 PROCEDURE — 3078F DIAST BP <80 MM HG: CPT | Performed by: NURSE PRACTITIONER

## 2024-09-16 PROCEDURE — 3074F SYST BP LT 130 MM HG: CPT | Performed by: NURSE PRACTITIONER

## 2024-09-16 PROCEDURE — 99204 OFFICE O/P NEW MOD 45 MIN: CPT | Performed by: NURSE PRACTITIONER

## 2024-09-16 RX ORDER — FLUTICASONE PROPIONATE 50 MCG
SPRAY, SUSPENSION (ML) NASAL EVERY 24 HOURS
COMMUNITY
Start: 2024-08-08

## 2024-09-16 RX ORDER — BIOTIN 1 MG
1000 TABLET ORAL
COMMUNITY
Start: 2023-05-08

## 2024-09-16 RX ORDER — AMPICILLIN TRIHYDRATE 250 MG
CAPSULE ORAL
COMMUNITY
Start: 2024-03-01

## 2024-09-16 RX ORDER — FAMOTIDINE 20 MG/1
TABLET, FILM COATED ORAL
COMMUNITY
Start: 2024-03-01

## 2024-09-16 NOTE — H&P
[facility]  Neurological Surgery Clinic Note    Lisa Simpson  1/25/1985  [id number]  PCP: Nash Vazquez MD    REASON FOR VISIT:  Left sided pulsatile tinnitus    HISTORY OF PRESENT ILLNESS:  Lisa Simpson is a 39 year old female with a PMH of cholecystectomy, hysterectomy, gastric sleeve surgery in 2015 with revision in February 2024 who presents with 2-month history of pulsatile tinnitus in the left ear.  Patient states she noticed whoosing and heartbeat sound 2 months ago.  About 2.5 months ago went to  with fullness in right side.  She had been followed up with ENT, was treated for Eustacion tube syndrone was on steroids and flonase  Had hearing test normal.  She reports hearing diminished on the right, hard to hear over whoosing sound. She has TMJ symptoms, waking up with headache  Tinnitus is intermittent.  She reports a long history of migraines but has noticed more headaches recently experiencing headaches every other day.  She describes this as pressure in her face and also feels tingling on the left side of her face.  Pain can get up to 10 out of 10.  She is hesitant on taking too many medications based on her gastric surgeries but will take Tylenol and rest as needed.  She notes that the tinnitus sounds decreases when she turns her head to the left but has not noticed any change in the sound with neck or jugular vein manipulation.    Prior to her gastric surgery at her highest weight she weighed up to 311 pounds.  She reports going down to 199 and weight has fluctuated up afterwards.  She does not consistently have blurriness or double vision.    PAST MEDICAL HISTORY:  No past medical history on file.    PAST SURGICAL HISTORY:  Past Surgical History:   Procedure Laterality Date    Cholecystectomy      Sx Hx taken by JF    Cyst removal Bilateral 07/21/2018    Left endometrioma, adhesions    Hysterectomy  09/23/2019    BSO Dr. Saleem w/path showing bilateral endometriomas,  adhesions present    Lap sleeve gastrectomy      Hiatal Hernia Repair -Dr Stockton, Sx Hx taken by CECILE    Other surgical history  12/2020    Panacolectomy       FAMILY HISTORY:  family history includes Breast Cancer in her mother; Diabetes in her father and mother; Heart Disease in her mother; Hypertension in her mother; Thyroid Disorder in her father.    SOCIAL HISTORY:   reports that she has never smoked. She has never used smokeless tobacco. She reports current alcohol use. She reports that she does not use drugs.    ALLERGIES:  Allergies   Allergen Reactions    Adhesive Tape RASH       MEDICATIONS:  Current Outpatient Medications on File Prior to Visit   Medication Sig Dispense Refill    famotidine 20 MG Oral Tab       fluticasone propionate 50 MCG/ACT Nasal Suspension daily.      Biotin 1000 MCG Oral Tab 1,000 mcg.      Coenzyme Q10 (COQ10) 200 MG Oral Cap       Wheat Dextrin (BENEFIBER OR) 2 g.      Multiple Vitamins-Minerals (WOMENS MULTIVITAMIN) Oral Tab as directed Orally      estradiol 1 MG Oral Tab Take 1 tablet (1 mg total) by mouth daily. 90 tablet 4     No current facility-administered medications on file prior to visit.       REVIEW OF SYSTEMS:  A 10-point system was reviewed.  Pertinent positives and negatives are noted in HPI.      PHYSICAL EXAMINATION:  VITAL SIGNS: /76   Pulse 72   LMP  (LMP Unknown)     A&Ox3, no acute distress  PERRL, EOMi, FS, TM  Full strength x 4, no drift  Sensation intact       ASSESSMENT:  39-year-old female with a 2-month history of pulsatile tinnitus on the left side.    Plan:  We discussed of the causes of pulsatile tinnitus as either being dangerous versus nondangerous causes.  I recommended obtaining vascular imaging.  We will start with a CTA of the head and neck.  This will be to rule out an arterial venous malformation or AV fistula.     We discussed that any intervention would require an invasive procedure and that we must weigh the risks and benefits prior  to recommending any further invasive test.     After imaging is obtained patient can follow-up with Dr. Gagnon to discuss the results.    NICOLA Watson, CNP  Neurological Surgery  [facility]    Care Time: 45 min including face to face time, chart review, imaging interpretation, and coordination of care

## 2024-09-16 NOTE — PATIENT INSTRUCTIONS
Schedule CTA head and neck  Schedule a follow up with Dr Gagnon    Refill policies:    Allow 2-3 business days for refills; controlled substances may take longer.  Contact your pharmacy at least 5 days prior to running out of medication and have them send an electronic request or submit request through the “request refill” option in your [employer] account.  Refills are not addressed on weekends; covering physicians do not authorize routine medications on weekends.  No narcotics or controlled substances are refilled after noon on Fridays or by on call physicians.  By law, narcotics must be electronically prescribed.  A 30 day supply with no refills is the maximum allowed.  If your prescription is due for a refill, you may be due for a follow up appointment.  To best provide you care, patients receiving routine medications need to be seen at least once a year.  Patients receiving narcotic/controlled substance medications need to be seen at least once every 3 months.  In the event that your preferred pharmacy does not have the requested medication in stock (e.g. Backordered), it is your responsibility to find another pharmacy that has the requested medication available.  We will gladly send a new prescription to that pharmacy at your request.    Scheduling Tests:    If your physician has ordered radiology tests such as MRI or CT scans, please contact Central Scheduling at [phone] right away to schedule the test.  Once scheduled, the [facility] Centralized Referral Team will work with your insurance carrier to obtain pre-certification or prior authorization.  Depending on your insurance carrier, approval may take 3-10 days.  It is highly recommended patients assure they have received an authorization before having a test performed.  If test is done without insurance authorization, patient may be responsible for the entire amount billed.      Precertification and Prior Authorizations:  If your physician has recommended  that you have a procedure or additional testing performed the [facility] Centralized Referral Team will contact your insurance carrier to obtain pre-certification or prior authorization.    You are strongly encouraged to contact your insurance carrier to verify that your procedure/test has been approved and is a COVERED benefit.  Although the [facility] Centralized Referral Team does its due diligence, the insurance carrier gives the disclaimer that \"Although the procedure is authorized, this does not guarantee payment.\"    Ultimately the patient is responsible for payment.   Thank you for your understanding in this matter.  Paperwork Completion:  If you require FMLA or disability paperwork for your recovery, please make sure to either drop it off or have it faxed to our office at [phone]. Be sure the form has your name and date of birth on it.  The form will be faxed to our Forms Department and they will complete it for you.  There is a 25$ fee for all forms that need to be filled out.  Please be aware there is a 10-14 day turnaround time.  You will need to sign a release of information (DAWSON) form if your paperwork does not come with one.  You may call the Forms Department with any questions at [phone].  Their fax number is [phone].

## 2024-09-27 ENCOUNTER — HOSPITAL ENCOUNTER (OUTPATIENT)
Dept: CT IMAGING | Facility: HOSPITAL | Age: 39
Discharge: HOME | End: 2024-09-27
Attending: NURSE PRACTITIONER
Payer: COMMERCIAL

## 2024-09-27 DIAGNOSIS — H93.A9 PULSATILE TINNITUS: ICD-10-CM

## 2024-09-27 LAB
CREAT BLD-MCNC: 0.9 MG/DL
EGFRCR SERPLBLD CKD-EPI 2021: 83 ML/MIN/1.73M2 (ref 60–?)

## 2024-09-27 PROCEDURE — 70498 CT ANGIOGRAPHY NECK: CPT | Performed by: NURSE PRACTITIONER

## 2024-09-27 PROCEDURE — 70496 CT ANGIOGRAPHY HEAD: CPT | Performed by: NURSE PRACTITIONER

## 2024-09-27 PROCEDURE — 82565 ASSAY OF CREATININE: CPT

## 2024-10-17 ENCOUNTER — OFFICE VISIT (OUTPATIENT)
Dept: SURGERY | Facility: CLINIC | Age: 39
End: 2024-10-17
Payer: COMMERCIAL

## 2024-10-17 VITALS
WEIGHT: 240 LBS | DIASTOLIC BLOOD PRESSURE: 72 MMHG | BODY MASS INDEX: 44.16 KG/M2 | SYSTOLIC BLOOD PRESSURE: 130 MMHG | OXYGEN SATURATION: 99 % | HEIGHT: 62 IN | HEART RATE: 75 BPM

## 2024-10-17 DIAGNOSIS — R51.9 CHRONIC NONINTRACTABLE HEADACHE, UNSPECIFIED HEADACHE TYPE: Primary | ICD-10-CM

## 2024-10-17 DIAGNOSIS — H93.A2 PULSATILE TINNITUS OF LEFT EAR: ICD-10-CM

## 2024-10-17 DIAGNOSIS — G89.29 CHRONIC NONINTRACTABLE HEADACHE, UNSPECIFIED HEADACHE TYPE: Primary | ICD-10-CM

## 2024-10-17 PROBLEM — R94.31 ABNORMAL ELECTROCARDIOGRAM (ECG) (EKG): Status: ACTIVE | Noted: 2024-01-02

## 2024-10-17 PROBLEM — M54.50 CHRONIC LOWER BACK PAIN: Status: ACTIVE | Noted: 2022-03-18

## 2024-10-17 PROBLEM — M53.9 BACK DISORDER: Status: ACTIVE | Noted: 2022-03-18

## 2024-10-17 PROBLEM — G47.30 SLEEP APNEA: Status: ACTIVE | Noted: 2024-10-17

## 2024-10-17 PROBLEM — K21.9 GERD (GASTROESOPHAGEAL REFLUX DISEASE): Status: ACTIVE | Noted: 2024-10-17

## 2024-10-17 PROBLEM — M99.73 CONNECTIVE TISSUE AND DISC STENOSIS OF INTERVERTEBRAL FORAMINA OF LUMBAR REGION: Status: ACTIVE | Noted: 2022-09-26

## 2024-10-17 PROBLEM — G50.1 ATYPICAL FACIAL PAIN: Status: ACTIVE | Noted: 2022-03-18

## 2024-10-17 PROBLEM — D64.9 ANEMIA: Status: ACTIVE | Noted: 2024-10-17

## 2024-10-17 PROBLEM — K21.00 ESOPHAGITIS, REFLUX: Status: ACTIVE | Noted: 2024-10-17

## 2024-10-17 PROBLEM — E66.01 MORBID OBESITY (HCC): Status: ACTIVE | Noted: 2022-02-07

## 2024-10-17 PROBLEM — M19.90 ARTHRITIS: Status: ACTIVE | Noted: 2022-02-07

## 2024-10-17 PROBLEM — M41.9 SCOLIOSIS: Status: ACTIVE | Noted: 2022-03-18

## 2024-10-17 PROBLEM — R10.9 ABDOMINAL PAIN: Status: ACTIVE | Noted: 2024-03-08

## 2024-10-17 PROCEDURE — 3008F BODY MASS INDEX DOCD: CPT | Performed by: NEUROLOGICAL SURGERY

## 2024-10-17 PROCEDURE — 3075F SYST BP GE 130 - 139MM HG: CPT | Performed by: NEUROLOGICAL SURGERY

## 2024-10-17 PROCEDURE — 3078F DIAST BP <80 MM HG: CPT | Performed by: NEUROLOGICAL SURGERY

## 2024-10-17 PROCEDURE — 99205 OFFICE O/P NEW HI 60 MIN: CPT | Performed by: NEUROLOGICAL SURGERY

## 2024-10-17 NOTE — PROGRESS NOTES
[facility]  Neurological Surgery Clinic Note    Lisa Simpson  1/25/1985  [id number]  PCP: Nash Vazquez MD    REASON FOR VISIT:  Left sided pulsatile tinnitus     HISTORY OF PRESENT ILLNESS:  Lisa Simpson is a 39 year old female with a PMH of cholecystectomy, hysterectomy, gastric sleeve surgery in 2015 with revision in February 2024 who initially presented on 9/16/2024 to our clinic with 2-month history of pulsatile tinnitus in the left ear.  Patient states she noticed whoosing and heartbeat sound 2 months ago.  About 2.5 months ago went to  with fullness in right side.  She had been followed up with ENT, was treated for Eustacion tube syndrone was on steroids and flonase  Had hearing test normal.  She reports hearing diminished on the right, hard to hear over whoosing sound. She has TMJ symptoms, waking up with headache  Tinnitus is intermittent.  She reports a long history of migraines but has noticed more headaches recently experiencing headaches every other day.  She describes this as pressure in her face and also feels tingling on the left side of her face.  Pain can get up to 10 out of 10.  She is hesitant on taking too many medications based on her gastric surgeries but will take Tylenol and rest as needed.  She notes that the tinnitus sounds decreases when she turns her head to the left but has not noticed any change in the sound with neck or jugular vein manipulation.     Prior to her gastric surgery at her highest weight she weighed up to 311 pounds.  She reports going down to 199 and weight has fluctuated up afterwards.  She does not consistently have blurriness or double vision.    Interval history 10/17/2024  She reports no new symptoms since her last visit.  She reports persistent intermittent pulsatile tinnitus which can get very severe at times.  She reports that her left ear pressure and fullness is more uncomfortable than the pulsatile  tinnitus that she experiences.  THI 58.  She had an episode of lightheadedness recently.  She has intermittent headaches and had a few episodes of blurry vision but this is not persistent.  CTA head and neck 9/27/2024 demonstrates no dangerous causes of pulsatile tinnitus including no vascular malformations.  Her right transverse sigmoid system is dominant, and she may have a left sided stenosis of the transverse sigmoid system.  She has not tried GLP-1 agonists to this point.  She is scheduled to follow-up with her bariatric surgeon next month.  She has not seen a neurologist for her headaches.  She has only seen optometry and has not been evaluated by ophthalmology.    PAST MEDICAL HISTORY:  History reviewed. No pertinent past medical history.    PAST SURGICAL HISTORY:  Past Surgical History:   Procedure Laterality Date    Cholecystectomy      Sx Hx taken by CECILE    Cyst removal Bilateral 07/21/2018    Left endometrioma, adhesions    Hysterectomy  09/23/2019    BSO Dr. Saleem w/path showing bilateral endometriomas, adhesions present    Lap sleeve gastrectomy      Hiatal Hernia Repair -Dr Stockton, Sx Hx taken by CECILE    Other surgical history  12/2020    Panacolectomy       FAMILY HISTORY:  family history includes Breast Cancer in her mother; Diabetes in her father and mother; Heart Disease in her mother; Hypertension in her mother; Thyroid Disorder in her father.    SOCIAL HISTORY:   reports that she has never smoked. She has never used smokeless tobacco. She reports current alcohol use. She reports that she does not use drugs.    ALLERGIES:  Allergies[1]    MEDICATIONS:  Medications Ordered Prior to Encounter[2]    REVIEW OF SYSTEMS:  A 10-point system was reviewed.  Pertinent positives and negatives are noted in HPI.      PHYSICAL EXAMINATION:  VITAL SIGNS: /72 (BP Location: Left arm, Patient Position: Sitting, Cuff Size: large)   Pulse 75   Ht 62\"   Wt 240 lb (108.9 kg)   LMP  (LMP Unknown)   SpO2 99%    BMI 43.90 kg/m²     A&Ox3, no acute distress  PERRL, EOMi, FS  Full strength  Sensation intact     ASSESSMENT:  39-year-old female with left-sided pulsatile tinnitus    I discussed with the patient the etiologies of pulsatile tinnitus.  I explained that the CTA effectively rules out the concerning causes.      We discussed the possibility that her pulsatile tinnitus may be attributable to idiopathic intracranial hypertension given her body habitus and headaches.  I explained that further evaluation by ophthalmology for papilledema and neurology is prudent.    We discussed the venous causes of pulsatile tinnitus and we discussed the workup and treatment options available.  She agrees with proceeding with further workup, and understands that if her symptoms become significantly distressing that we are available to proceed with further workup and treatment for the pulsatile tinnitus and isolation.    Plan:  - Ophthalmology referral placed to evaluate for papilledema  - Neurology referral placed to evaluate for IIH  - Follow-up with her bariatric surgeon, Dr. Vasquez, as scheduled  - Follow-up in 6 months for reevaluation, and she may follow-up with us sooner if her symptoms significantly worsen and she is interested in pursuing further workup and treatment for the pulsatile tinnitus and isolation    Joni Gagnon MD  Neurological Surgery  [facility] Time: 40 min including face to face time, chart review, imaging interpretation, and coordination of care         [1]   Allergies  Allergen Reactions    Adhesive Tape RASH   [2]   Current Outpatient Medications on File Prior to Visit   Medication Sig Dispense Refill    famotidine 20 MG Oral Tab       fluticasone propionate 50 MCG/ACT Nasal Suspension daily.      Biotin 1000 MCG Oral Tab 1,000 mcg.      Coenzyme Q10 (COQ10) 200 MG Oral Cap       Wheat Dextrin (BENEFIBER OR) 2 g.      Multiple Vitamins-Minerals  (WOMENS MULTIVITAMIN) Oral Tab as directed Orally      estradiol 1 MG Oral Tab Take 1 tablet (1 mg total) by mouth daily. 90 tablet 4     No current facility-administered medications on file prior to visit.

## 2024-10-17 NOTE — PATIENT INSTRUCTIONS
Refill policies:    Allow 2-3 business days for refills; controlled substances may take longer.  Contact your pharmacy at least 5 days prior to running out of medication and have them send an electronic request or submit request through the “request refill” option in your [employer] account.  Refills are not addressed on weekends; covering physicians do not authorize routine medications on weekends.  No narcotics or controlled substances are refilled after noon on Fridays or by on call physicians.  By law, narcotics must be electronically prescribed.  A 30 day supply with no refills is the maximum allowed.  If your prescription is due for a refill, you may be due for a follow up appointment.  To best provide you care, patients receiving routine medications need to be seen at least once a year.  Patients receiving narcotic/controlled substance medications need to be seen at least once every 3 months.  In the event that your preferred pharmacy does not have the requested medication in stock (e.g. Backordered), it is your responsibility to find another pharmacy that has the requested medication available.  We will gladly send a new prescription to that pharmacy at your request.    Scheduling Tests:    If your physician has ordered radiology tests such as MRI or CT scans, please contact Central Scheduling at [phone] right away to schedule the test.  Once scheduled, the [facility] Centralized Referral Team will work with your insurance carrier to obtain pre-certification or prior authorization.  Depending on your insurance carrier, approval may take 3-10 days.  It is highly recommended patients assure they have received an authorization before having a test performed.  If test is done without insurance authorization, patient may be responsible for the entire amount billed.      Precertification and Prior Authorizations:  If your physician has recommended that you have a procedure or additional testing performed the [facility]  Centralized Referral Team will contact your insurance carrier to obtain pre-certification or prior authorization.    You are strongly encouraged to contact your insurance carrier to verify that your procedure/test has been approved and is a COVERED benefit.  Although the [facility] Centralized Referral Team does its due diligence, the insurance carrier gives the disclaimer that \"Although the procedure is authorized, this does not guarantee payment.\"    Ultimately the patient is responsible for payment.   Thank you for your understanding in this matter.  Paperwork Completion:  If you require FMLA or disability paperwork for your recovery, please make sure to either drop it off or have it faxed to our office at [phone]. Be sure the form has your name and date of birth on it.  The form will be faxed to our Forms Department and they will complete it for you.  There is a 25$ fee for all forms that need to be filled out.  Please be aware there is a 10-14 day turnaround time.  You will need to sign a release of information (DAWSON) form if your paperwork does not come with one.  You may call the Forms Department with any questions at [phone].  Their fax number is [phone].

## 2024-10-30 ENCOUNTER — TELEPHONE (OUTPATIENT)
Dept: SURGERY | Facility: CLINIC | Age: 39
End: 2024-10-30

## 2024-10-30 NOTE — TELEPHONE ENCOUNTER
PSR notes pt scheduled appt as NP with Dr Rollins  yet is established with Dr Gagnon and seen for same reason as pt listed as reason for appt with Dr Rollins. Please confirm psr to reach out to pt and cx this appt unless pt is wishing to transfer care?

## 2024-10-31 ENCOUNTER — PATIENT MESSAGE (OUTPATIENT)
Dept: SURGERY | Facility: CLINIC | Age: 39
End: 2024-10-31

## 2024-10-31 ENCOUNTER — MED REC SCAN ONLY (OUTPATIENT)
Dept: SURGERY | Facility: CLINIC | Age: 39
End: 2024-10-31

## 2024-10-31 ENCOUNTER — TELEPHONE (OUTPATIENT)
Dept: SURGERY | Facility: CLINIC | Age: 39
End: 2024-10-31

## 2024-10-31 NOTE — TELEPHONE ENCOUNTER
Message below noted.    Pt acknowledged and appreciative of message.    Nothing needed further with this encounter.

## 2024-10-31 NOTE — TELEPHONE ENCOUNTER
Pt asking if Dr would recommend and MVA to check vascular for pulsatile tinnitus to see if blood flow issue. Please call to advise.

## 2024-10-31 NOTE — TELEPHONE ENCOUNTER
Message below noted.    Pt wanted to know if she could possibly get an order for MRV. Please see other TE from 10/31/24.    LOV 10/17/24  \"ASSESSMENT:  39-year-old female with left-sided pulsatile tinnitus     I discussed with the patient the etiologies of pulsatile tinnitus.  I explained that the CTA effectively rules out the concerning causes.       We discussed the possibility that her pulsatile tinnitus may be attributable to idiopathic intracranial hypertension given her body habitus and headaches.  I explained that further evaluation by ophthalmology for papilledema and neurology is prudent.     We discussed the venous causes of pulsatile tinnitus and we discussed the workup and treatment options available.  She agrees with proceeding with further workup, and understands that if her symptoms become significantly distressing that we are available to proceed with further workup and treatment for the pulsatile tinnitus and isolation.     Plan:  - Ophthalmology referral placed to evaluate for papilledema  - Neurology referral placed to evaluate for IIH  - Follow-up with her bariatric surgeon, Dr. Vasquez, as scheduled  - Follow-up in 6 months for reevaluation, and she may follow-up with us sooner if her symptoms significantly worsen and she is interested in pursuing further workup and treatment for the pulsatile tinnitus and isolation\"    Routed to Provider.

## 2024-10-31 NOTE — TELEPHONE ENCOUNTER
Message below noted.    Please see other TE from 10/31/24.    Message routed to Provider.    Nothing needed further with this encounter.

## 2024-10-31 NOTE — TELEPHONE ENCOUNTER
Yes, thank you for catching this.     Please clarify with patient that she is seeking another opinion.     Then, if this was scheduled by mistake, please remove.     Thank you!

## 2024-12-12 ENCOUNTER — OFFICE VISIT (OUTPATIENT)
Dept: NEUROLOGY | Facility: CLINIC | Age: 39
End: 2024-12-12
Payer: COMMERCIAL

## 2024-12-12 VITALS
DIASTOLIC BLOOD PRESSURE: 76 MMHG | RESPIRATION RATE: 16 BRPM | SYSTOLIC BLOOD PRESSURE: 124 MMHG | WEIGHT: 244.38 LBS | HEART RATE: 86 BPM | BODY MASS INDEX: 45 KG/M2

## 2024-12-12 DIAGNOSIS — H93.A9 PULSATILE TINNITUS: ICD-10-CM

## 2024-12-12 DIAGNOSIS — R51.9 BILATERAL HEADACHES: Primary | ICD-10-CM

## 2024-12-12 PROCEDURE — 3074F SYST BP LT 130 MM HG: CPT | Performed by: OTHER

## 2024-12-12 PROCEDURE — 99205 OFFICE O/P NEW HI 60 MIN: CPT | Performed by: OTHER

## 2024-12-12 PROCEDURE — 3078F DIAST BP <80 MM HG: CPT | Performed by: OTHER

## 2024-12-12 RX ORDER — TOPIRAMATE 50 MG/1
50 TABLET, FILM COATED ORAL 2 TIMES DAILY
Qty: 60 TABLET | Refills: 5 | Status: SHIPPED | OUTPATIENT
Start: 2024-12-12

## 2024-12-12 NOTE — PROGRESS NOTES
HPI:    Patient ID: Lisa Simpson is a 39 year old female.    HPI  I am seeing her for the first time today I have not seen her previously.  She was referred by Dr. Andrez Barajas for further evaluation and management of potential idiopathic intracranial hypertension.  She told me that back in June she experienced hearing problems with her right ear, mostly fullness sensation of the right ear, this eventually resolved and she started experiencing pulsatile tinnitus with her left ear and muffled hearing.  This occurred approximately 4 months ago.  She told me she was  evaluated by ENT and an audiogram was normal.   She told me the sensation of muffled hearing and tinnitus has been constant over the past few months until this morning, she feels slightly better.  She also experiences headaches, she has long history of headaches mostly consistent with her TMJ problem.  She said she would get headache every time the TMJ problem flares up, she is a bariatric patients and instructed to take Tylenol only to abort the headache.  The headache is bilateral, bitemporal and will transition to bifrontal at times.  It could get to 10/10 in severity, may or may not occur with the pulsatile tinnitus.  She never lost vision completely, she had 2 episodes of blurred vision that would last for seconds.  She had lost significant amount of weight, initially had gastric sleeve surgery in 2015 then had a bypass earlier this year.  Has not tried Topamax in the past for her headaches. PSH significant for hysterectomy  She had a visit to the ophthalmologist who performed a funduscopic exam, see results scanned in her medical record.  She had no evidence of papilledema  She told me she has not recently been started on any new medication such as acne medication or OCPs    HISTORY:  History reviewed. No pertinent past medical history.   Past Surgical History:   Procedure Laterality Date    Cholecystectomy      Sx Hx taken by [facility]    Cyst removal  Bilateral 07/21/2018    Left endometrioma, adhesions    Hysterectomy  09/23/2019    BSO Dr. Saleem w/path showing bilateral endometriomas, adhesions present    Lap sleeve gastrectomy      Hiatal Hernia Repair -Dr Stockton, Sx Hx taken by [facility]    Other surgical history  12/2020    Panacolectomy      Family History   Problem Relation Age of Onset    Diabetes Father     Thyroid Disorder Father     Diabetes Mother     Breast Cancer Mother     Heart Disease Mother     Hypertension Mother       Social History     Socioeconomic History    Marital status:    Tobacco Use    Smoking status: Never    Smokeless tobacco: Never   Vaping Use    Vaping status: Never Used   Substance and Sexual Activity    Alcohol use: Yes     Comment: occ    Drug use: Never   Other Topics Concern    Caffeine Concern Yes     Comment: coffee    Exercise Yes     Social Drivers of Health      Received from [facility]        Review of Systems  Negative except as in HPI       Current Outpatient Medications   Medication Sig Dispense Refill    topiramate 50 MG Oral Tab Take 1 tablet (50 mg total) by mouth 2 (two) times daily. 60 tablet 5    famotidine 20 MG Oral Tab       fluticasone propionate 50 MCG/ACT Nasal Suspension daily.      Coenzyme Q10 (COQ10) 200 MG Oral Cap       Wheat Dextrin (BENEFIBER OR) 2 g.      Multiple Vitamins-Minerals (WOMENS MULTIVITAMIN) Oral Tab as directed Orally      estradiol 1 MG Oral Tab Take 1 tablet (1 mg total) by mouth daily. 90 tablet 4     Allergies:Allergies[1]  PHYSICAL EXAM:   Physical Exam    General Appearance: Well nourished, well developed, no apparent distress.     HEENT: Normocephalic and atraumatic. Normal sclera.  Neck: Normal range of motion. Neck supple.  Cardiovascular: Normal rate, regular rhythm   Pulmonary/Chest: Effort normal     Mental Status Exam: Patient is awake, alert and oriented to person, place and time with normal memory, fund of knowledge, attention/concentration and  language.    Cranial Nerves:funduscopic exam is normal  II: Visual fields: normal to confrontation test  III: Pupils: equal, round, reactive to light, NO APD  III,IV,VI: Normal EOM. Saccades  V: Facial sensation: intact  VII: Facial strength: Normal  VIII: Hearing: intact  IX: Palate elevates symmetrically  XI: Shoulder shrug: symmetric  XII: Tongue protrudes in midline    Motor Exam: Normal tone. Strength is  5 out of 5 in proximal and distal muscles of upper and lower extremities  DTR: 2+ and symmetric.    Sensory: Normal sensation to superficial touch, vibration in all extremities    Coordination: Normal finger-nose-finger test    Gait: Stands up and walk unassisted.  No shuffling no freezing of gait.  Normal arm swings bilaterally.  Normal stride.  Normal postural reflexes    TESTS/IMAGING:         ASSESSMENT/PLAN:     Encounter Diagnoses   Name Primary?    Bilateral headaches Yes    Pulsatile tinnitus        No orders of the defined types were placed in this encounter.    Migraines:    I am not suspecting that she has idiopathic intracranial hypertension despite the risk factors she has  I found no evidence of papilledema on my exam today, she was recently evaluated by ophthalmology and a dilated exam did not reveal papilledema, see ophthalmology notes scanned in her chart  I will get MRI brain with and without contrast and MRV  I told her to start Topamax 25 mg at bedtime for 1 week and titrate the dose up gradually to 50 mg twice daily.  I explained that the Topamax could help with weight loss too.   I will see her in 3 months or sooner if needed    60 min spent counseling      Meds This Visit:  Requested Prescriptions     Signed Prescriptions Disp Refills    topiramate 50 MG Oral Tab 60 tablet 5     Sig: Take 1 tablet (50 mg total) by mouth 2 (two) times daily.       Imaging & Referrals:  MRI BRAIN (W+WO) (CPT=70553)  MRV BRAIN (CPT=70544)     ID#[id number]       [1]   Allergies  Allergen Reactions    Adhesive  Tape RASH

## 2024-12-12 NOTE — PATIENT INSTRUCTIONS
1- I will get MRI brain and MRV  2- Start Topamax 25mg at bedtime for 1 week then 50mg at bedtime for 1 week , then 50mg at bedtime and 25 mg in the morning for 1 week then 50mg twice daily      Refill policies:    Allow 2-3 business days for refills; controlled substances may take longer.  Contact your pharmacy at least 5 days prior to running out of medication and have them send an electronic request or submit request through the “request refill” option in your [employer] account.  Refills are not addressed on weekends; covering physicians do not authorize routine medications on weekends.  No narcotics or controlled substances are refilled after noon on Fridays or by on call physicians.  By law, narcotics must be electronically prescribed.  A 30 day supply with no refills is the maximum allowed.  If your prescription is due for a refill, you may be due for a follow up appointment.  To best provide you care, patients receiving routine medications need to be seen at least once a year.  Patients receiving narcotic/controlled substance medications need to be seen at least once every 3 months.  In the event that your preferred pharmacy does not have the requested medication in stock (e.g. Backordered), it is your responsibility to find another pharmacy that has the requested medication available.  We will gladly send a new prescription to that pharmacy at your request.    Scheduling Tests:    If your physician has ordered radiology tests such as MRI or CT scans, please contact Central Scheduling at [phone] right away to schedule the test.  Once scheduled, the [facility] Centralized Referral Team will work with your insurance carrier to obtain pre-certification or prior authorization.  Depending on your insurance carrier, approval may take 3-10 days.  It is highly recommended patients assure they have received an authorization before having a test performed.  If test is done without insurance authorization, patient may be  responsible for the entire amount billed.      Precertification and Prior Authorizations:  If your physician has recommended that you have a procedure or additional testing performed the [facility] Centralized Referral Team will contact your insurance carrier to obtain pre-certification or prior authorization.    You are strongly encouraged to contact your insurance carrier to verify that your procedure/test has been approved and is a COVERED benefit.  Although the [facility] Centralized Referral Team does its due diligence, the insurance carrier gives the disclaimer that \"Although the procedure is authorized, this does not guarantee payment.\"    Ultimately the patient is responsible for payment.   Thank you for your understanding in this matter.  Paperwork Completion:  If you require FMLA or disability paperwork for your recovery, please make sure to either drop it off or have it faxed to our office at [phone]. Be sure the form has your name and date of birth on it.  The form will be faxed to our Forms Department and they will complete it for you.  There is a 25$ fee for all forms that need to be filled out.  Please be aware there is a 10-14 day turnaround time.  You will need to sign a release of information (DAWSON) form if your paperwork does not come with one.  You may call the Forms Department with any questions at [phone].  Their fax number is [phone].

## 2025-01-02 ENCOUNTER — TELEPHONE (OUTPATIENT)
Dept: NEUROLOGY | Facility: CLINIC | Age: 40
End: 2025-01-02

## 2025-01-03 NOTE — TELEPHONE ENCOUNTER
MRV DOS 01/02/2025 currently available in epic. Placed paper copy in [facility] bin

## 2025-01-09 ENCOUNTER — WALK IN (OUTPATIENT)
Dept: URGENT CARE | Age: 40
End: 2025-01-09

## 2025-01-09 VITALS
WEIGHT: 241.84 LBS | HEIGHT: 62 IN | HEART RATE: 84 BPM | TEMPERATURE: 98.5 F | BODY MASS INDEX: 44.5 KG/M2 | SYSTOLIC BLOOD PRESSURE: 149 MMHG | DIASTOLIC BLOOD PRESSURE: 95 MMHG | OXYGEN SATURATION: 99 % | RESPIRATION RATE: 17 BRPM

## 2025-01-09 DIAGNOSIS — J32.9 SINUSITIS, UNSPECIFIED CHRONICITY, UNSPECIFIED LOCATION: ICD-10-CM

## 2025-01-09 DIAGNOSIS — H66.91 RIGHT ACUTE OTITIS MEDIA: ICD-10-CM

## 2025-01-09 DIAGNOSIS — R05.1 ACUTE COUGH: Primary | ICD-10-CM

## 2025-01-09 DIAGNOSIS — R03.0 ELEVATED BLOOD PRESSURE READING: ICD-10-CM

## 2025-01-09 DIAGNOSIS — R06.2 WHEEZING: ICD-10-CM

## 2025-01-09 LAB
FLUAV AG UPPER RESP QL IA.RAPID: NEGATIVE
FLUBV AG UPPER RESP QL IA.RAPID: NEGATIVE
INTERNAL PROCEDURAL CONTROLS ACCEPTABLE: YES
INTERNAL PROCEDURAL CONTROLS ACCEPTABLE: YES
SARS-COV+SARS-COV-2 AG RESP QL IA.RAPID: NOT DETECTED
TEST LOT EXPIRATION DATE: NORMAL
TEST LOT EXPIRATION DATE: NORMAL
TEST LOT NUMBER: NORMAL
TEST LOT NUMBER: NORMAL

## 2025-01-09 RX ORDER — ALBUTEROL SULFATE 90 UG/1
INHALANT RESPIRATORY (INHALATION)
Qty: 1 EACH | Refills: 0 | Status: SHIPPED | OUTPATIENT
Start: 2025-01-09

## 2025-01-09 RX ORDER — TOPIRAMATE 50 MG/1
50 TABLET, FILM COATED ORAL 2 TIMES DAILY
COMMUNITY

## 2025-01-09 ASSESSMENT — PAIN SCALES - GENERAL
PAINLEVEL: 10
PAINLEVEL: 6

## 2025-02-13 RX ORDER — ESTRADIOL 1 MG/1
1 TABLET ORAL DAILY
Qty: 90 TABLET | Refills: 4 | OUTPATIENT
Start: 2025-02-13

## 2025-03-05 ENCOUNTER — OFFICE VISIT (OUTPATIENT)
Dept: OBGYN CLINIC | Facility: CLINIC | Age: 40
End: 2025-03-05
Payer: COMMERCIAL

## 2025-03-05 VITALS
WEIGHT: 236 LBS | SYSTOLIC BLOOD PRESSURE: 106 MMHG | DIASTOLIC BLOOD PRESSURE: 75 MMHG | BODY MASS INDEX: 43.43 KG/M2 | HEIGHT: 62 IN | HEART RATE: 78 BPM

## 2025-03-05 DIAGNOSIS — N95.1 HOT FLUSHES, PERIMENOPAUSAL: ICD-10-CM

## 2025-03-05 DIAGNOSIS — Z12.31 BREAST CANCER SCREENING BY MAMMOGRAM: ICD-10-CM

## 2025-03-05 DIAGNOSIS — Z01.419 NORMAL GYNECOLOGIC EXAMINATION: Primary | ICD-10-CM

## 2025-03-05 PROCEDURE — 99396 PREV VISIT EST AGE 40-64: CPT | Performed by: OBSTETRICS & GYNECOLOGY

## 2025-03-05 PROCEDURE — 3078F DIAST BP <80 MM HG: CPT | Performed by: OBSTETRICS & GYNECOLOGY

## 2025-03-05 PROCEDURE — 3008F BODY MASS INDEX DOCD: CPT | Performed by: OBSTETRICS & GYNECOLOGY

## 2025-03-05 PROCEDURE — 3074F SYST BP LT 130 MM HG: CPT | Performed by: OBSTETRICS & GYNECOLOGY

## 2025-03-05 RX ORDER — ESTRADIOL 1 MG/1
1 TABLET ORAL DAILY
Qty: 90 TABLET | Refills: 4 | Status: SHIPPED | OUTPATIENT
Start: 2025-03-05

## 2025-03-05 NOTE — PROGRESS NOTES
Lisa Simpson is a 40 year old female  No LMP recorded (lmp unknown). Patient has had a hysterectomy.   Chief Complaint   Patient presents with    Annual     Pt here for annual exam and estradiol refill    Pt annual exam. S/p hysterectomy.     OBSTETRICS HISTORY:     OB History    Para Term  AB Living   0 0 0 0 0 0   SAB IAB Ectopic Multiple Live Births   0 0 0 0 0       GYNE HISTORY:     Hx Prior Abnormal Pap: No  Pap Date: 11/10/23   Use of Birth Control (if yes, specify type): Hysterectomy (3/5/2025  9:54 AM)  Hx Prior Abnormal Pap: No (3/5/2025  9:54 AM)  Pap Date: 11/10/23 (3/5/2025  9:54 AM)        Latest Ref Rng & Units 11/10/2023    11:11 AM   RECENT PAP RESULTS   INTERPRETATION/RESULT: Negative for intraepithelial lesion or malignancy Negative for intraepithelial lesion or malignancy    HPV Negative Negative          MEDICAL HISTORY:     History reviewed. No pertinent past medical history.    SURGICAL HISTORY:     Past Surgical History:   Procedure Laterality Date    Cholecystectomy      Sx Hx taken by [facility]ECILE    Cyst removal Bilateral 2018    Left endometrioma, adhesions    Hysterectomy  2019    BSO Dr. Saleem w/path showing bilateral endometriomas, adhesions present    Lap sleeve gastrectomy      Hiatal Hernia Repair -Dr Stockton, Sx Hx taken by [facility]ECILE    Other surgical history  2020    Panacolectomy       SOCIAL HISTORY:     Social History     Socioeconomic History    Marital status:    Tobacco Use    Smoking status: Never     Passive exposure: Never    Smokeless tobacco: Never   Vaping Use    Vaping status: Never Used   Substance and Sexual Activity    Alcohol use: Yes     Comment: occ    Drug use: Never   Other Topics Concern    Caffeine Concern Yes     Comment: coffee    Exercise Yes     Social Drivers of Health     Food Insecurity: No Food Insecurity (3/5/2025)    NCSS - Food Insecurity     Worried About Running Out of Food in the Last Year: No     Ran Out of Food  in the Last Year: No   Transportation Needs: No Transportation Needs (3/5/2025)    NCSS - Transportation     Lack of Transportation: No   Housing Stability: Not At Risk (3/5/2025)    NCSS - Housing/Utilities     Has Housing: Yes     Worried About Losing Housing: No     Unable to Get Utilities: No        FAMILY HISTORY:     Family History   Problem Relation Age of Onset    Diabetes Father     Thyroid Disorder Father     Diabetes Mother     Breast Cancer Mother     Heart Disease Mother     Hypertension Mother        MEDICATIONS:       Current Outpatient Medications:     semaglutide-weight management 0.25 MG/0.5ML Subcutaneous Solution Auto-injector, Inject into the skin every 7 days., Disp: , Rfl:     estradiol 1 MG Oral Tab, Take 1 tablet (1 mg total) by mouth daily., Disp: 90 tablet, Rfl: 4    topiramate 50 MG Oral Tab, Take 1 tablet (50 mg total) by mouth 2 (two) times daily., Disp: 60 tablet, Rfl: 5    fluticasone propionate 50 MCG/ACT Nasal Suspension, daily., Disp: , Rfl:     Wheat Dextrin (BENEFIBER OR), 2 g., Disp: , Rfl:     Multiple Vitamins-Minerals (WOMENS MULTIVITAMIN) Oral Tab, as directed Orally, Disp: , Rfl:     famotidine 20 MG Oral Tab, , Disp: , Rfl:     Coenzyme Q10 (COQ10) 200 MG Oral Cap, , Disp: , Rfl:     ALLERGIES:     Allergies[1]      REVIEW OF SYSTEMS:     Constitutional:    denies fever / chills  Eyes:     denies blurred or double vision  Cardiovascular:  denies chest pain or palpitations  Respiratory:    denies shortness of breath  Gastrointestinal:  denies severe abdominal pain, frequent diarrhea or constipation, nausea / vomiting  Genitourinary:    denies dysuria, bothersome incontinence  Skin/Breast:   denies any breast pain, lumps, or discharge  Neurological:    denies frequent severe headaches  Psychiatric:   denies depression or anxiety, thoughts of harming self or others  Heme/Lymph:    denies easy bruising or bleeding      PHYSICAL EXAM:   Blood pressure 106/75, pulse 78, height  5' 2\" (1.575 m), weight 236 lb (107 kg), not currently breastfeeding.  Constitutional:  well developed, well nourished  Head/Face:  normocephalic  Neck/Thyroid: thyroid symmetric, no thyromegaly, no nodules, no adenopathy  Lymphatic: no abnormal supraclavicular or axillary adenopathy is noted  Respiratory:      chest wall symmetric and nontender on palpation, clear to asculation bilateral, no wheezing, rales, ronchi, and resonance normal upon percussion  Cardiovascular: chest normal in appearance, regular rate and rhythm, no murmurs, PMI palpated midclavicular line  Breast:   normal bilaterally without palpable masses, tenderness, asymmetry, nipple discharge, nipple retraction or skin changes bilaterally  Abdomen:   soft, nontender, nondistended, no masses  Skin/Hair:  no unusual rashes or bruises  Extremities:  no edema, no cyanosis, non tender bilaterally  Psychiatric:   oriented to time, place, person and situation. Appropriate mood and affect    Pelvic Exam:  External Genitalia:  normal appearance, hair distribution, and no lesions  Urethral Meatus:   normal in size, location, without lesions   Bladder:    no fullness, masses or tenderness  Vagina:    normal appearance without lesions, no abnormal discharge  Adnexa:   normal without masses or tenderness  Perineum:   normal  Anus: no hemorroids   Sve: no pelvic masses      ASSESSMENT & PLAN:     Lisa was seen today for annual.    Diagnoses and all orders for this visit:    Normal gynecologic examination  Nutrition, weight screening and exercise were discussed with the patient.  Exercise should encompass approximately 150 minutes/week.  Self breast exam counseling was also given.  I advised the patient to avoid tobacco, drugs and alcohol.  Influenza vaccine was offered if seasonally appropriate.  HPV and STD prevention counseling was given.  Health maintenance checklist  was reviewed including Pap smear, cervical cultures, and mammogram screening if  age-appropriate.  Appropriate follow-up scheduling was discussed with the patient.    Pap def per guideline  Breast cancer screening by mammogram  -     [facility] YUSUF 2D+3D SCREENING BILAT (CPT=77067/56113); Future    Hot flushes, perimenopausal  -     estradiol 1 MG Oral Tab; Take 1 tablet (1 mg total) by mouth daily.  After starting starting meds hot flushes resolved. Refill given. Riskls d/w pt.         FOLLOW-UP     Return in about 1 year (around 3/5/2026) for Annual exam.      Jt Padilla MD  3/5/2025       [1]   Allergies  Allergen Reactions    Adhesive Tape RASH

## 2025-03-13 ENCOUNTER — OFFICE VISIT (OUTPATIENT)
Dept: NEUROLOGY | Facility: CLINIC | Age: 40
End: 2025-03-13
Payer: COMMERCIAL

## 2025-03-13 VITALS
RESPIRATION RATE: 16 BRPM | BODY MASS INDEX: 43 KG/M2 | DIASTOLIC BLOOD PRESSURE: 70 MMHG | HEART RATE: 78 BPM | SYSTOLIC BLOOD PRESSURE: 126 MMHG | WEIGHT: 236.19 LBS

## 2025-03-13 DIAGNOSIS — H93.A9 PULSATILE TINNITUS: Primary | ICD-10-CM

## 2025-03-13 DIAGNOSIS — G43.E09 CHRONIC MIGRAINE WITH AURA WITHOUT STATUS MIGRAINOSUS, NOT INTRACTABLE: ICD-10-CM

## 2025-03-13 PROCEDURE — 3078F DIAST BP <80 MM HG: CPT | Performed by: OTHER

## 2025-03-13 PROCEDURE — 99215 OFFICE O/P EST HI 40 MIN: CPT | Performed by: OTHER

## 2025-03-13 PROCEDURE — 3074F SYST BP LT 130 MM HG: CPT | Performed by: OTHER

## 2025-03-13 RX ORDER — PROPRANOLOL HCL 20 MG
20 TABLET ORAL 3 TIMES DAILY
Qty: 90 TABLET | Refills: 3 | Status: SHIPPED | OUTPATIENT
Start: 2025-03-13

## 2025-03-13 NOTE — PATIENT INSTRUCTIONS
1- Take propranolol 20mg three times daily  2-Taper down Topamax starting Monday , take half a tablet twice a day for 1 week then stop  3- Return in 3 months  4- I reviewed MRI brain and MRV and there are no concerning structural lesions or vein thrombosis that would cause headaches    Refill policies:    Allow 2-3 business days for refills; controlled substances may take longer.  Contact your pharmacy at least 5 days prior to running out of medication and have them send an electronic request or submit request through the “request refill” option in your [employer] account.  Refills are not addressed on weekends; covering physicians do not authorize routine medications on weekends.  No narcotics or controlled substances are refilled after noon on Fridays or by on call physicians.  By law, narcotics must be electronically prescribed.  A 30 day supply with no refills is the maximum allowed.  If your prescription is due for a refill, you may be due for a follow up appointment.  To best provide you care, patients receiving routine medications need to be seen at least once a year.  Patients receiving narcotic/controlled substance medications need to be seen at least once every 3 months.  In the event that your preferred pharmacy does not have the requested medication in stock (e.g. Backordered), it is your responsibility to find another pharmacy that has the requested medication available.  We will gladly send a new prescription to that pharmacy at your request.    Scheduling Tests:    If your physician has ordered radiology tests such as MRI or CT scans, please contact Central Scheduling at [phone] right away to schedule the test.  Once scheduled, the [facility] Centralized Referral Team will work with your insurance carrier to obtain pre-certification or prior authorization.  Depending on your insurance carrier, approval may take 3-10 days.  It is highly recommended patients assure they have received an authorization before  having a test performed.  If test is done without insurance authorization, patient may be responsible for the entire amount billed.      Precertification and Prior Authorizations:  If your physician has recommended that you have a procedure or additional testing performed the [facility] Centralized Referral Team will contact your insurance carrier to obtain pre-certification or prior authorization.    You are strongly encouraged to contact your insurance carrier to verify that your procedure/test has been approved and is a COVERED benefit.  Although the [facility] Centralized Referral Team does its due diligence, the insurance carrier gives the disclaimer that \"Although the procedure is authorized, this does not guarantee payment.\"    Ultimately the patient is responsible for payment.   Thank you for your understanding in this matter.  Paperwork Completion:  If you require FMLA or disability paperwork for your recovery, please make sure to either drop it off or have it faxed to our office at [phone]. Be sure the form has your name and date of birth on it.  The form will be faxed to our Forms Department and they will complete it for you.  There is a 25$ fee for all forms that need to be filled out.  Please be aware there is a 10-14 day turnaround time.  You will need to sign a release of information (DAWSON) form if your paperwork does not come with one.  You may call the Forms Department with any questions at [phone].  Their fax number is [phone].

## 2025-03-13 NOTE — PROGRESS NOTES
HPI:    Patient ID: Lisa Simpson is a 40 year old female.    HPI    I last saw her on 12/12/2024, she was referred to me for concern of idiopathic intracranial hypertension, ophthalmology examined her fundi and there was no evidence of papilledema and she has no evidence of papilledema on my exam last visit.  I will obtain MRI brain with and without contrast that did not reveal any structural lesions to explain her headaches, MRV did not reveal any evidence of cerebral venous sinus thrombosis.   I started her on Topamax for her migraines, she now takes 50 mg twice daily, today she is telling me that the Topamax has indeed helped with her headaches, she used to get bad headaches about 3 or 4 days/week now she gets them once a week, overall the intensity and the duration of her headaches have decreased significantly.  She told me however that she has been experiencing drowsiness as a side effect of Topamax and has been feeling tingling in her fingertips.  Her pulsatile tinnitus has not resolved, she had previously seen ENT for that.       HISTORY:  No past medical history on file.   Past Surgical History:   Procedure Laterality Date    Cholecystectomy      Sx Hx taken by [facility]    Cyst removal Bilateral 07/21/2018    Left endometrioma, adhesions    Hysterectomy  09/23/2019    BSO Dr. Saleem w/path showing bilateral endometriomas, adhesions present    Lap sleeve gastrectomy      Hiatal Hernia Repair -Dr [place], Sx Hx taken by [facility]    Other surgical history  12/2020    Panacolectomy      Family History   Problem Relation Age of Onset    Diabetes Father     Thyroid Disorder Father     Diabetes Mother     Breast Cancer Mother     Heart Disease Mother     Hypertension Mother       Social History     Socioeconomic History    Marital status:    Tobacco Use    Smoking status: Never     Passive exposure: Never    Smokeless tobacco: Never   Vaping Use    Vaping status: Never Used   Substance and Sexual Activity     Alcohol use: Yes     Comment: occ    Drug use: Never   Other Topics Concern    Caffeine Concern Yes     Comment: coffee    Exercise Yes     Social Drivers of Health     Food Insecurity: No Food Insecurity (3/5/2025)    NCSS - Food Insecurity     Worried About Running Out of Food in the Last Year: No     Ran Out of Food in the Last Year: No   Transportation Needs: No Transportation Needs (3/5/2025)    NCSS - Transportation     Lack of Transportation: No   Housing Stability: Not At Risk (3/5/2025)    NCSS - Housing/Utilities     Has Housing: Yes     Worried About Losing Housing: No     Unable to Get Utilities: No        Review of Systems    Negative except as in HPI     Current Outpatient Medications   Medication Sig Dispense Refill    propranolol 20 MG Oral Tab Take 1 tablet (20 mg total) by mouth 3 (three) times daily. 90 tablet 3    semaglutide-weight management 0.25 MG/0.5ML Subcutaneous Solution Auto-injector Inject into the skin every 7 days.      estradiol 1 MG Oral Tab Take 1 tablet (1 mg total) by mouth daily. 90 tablet 4    topiramate 50 MG Oral Tab Take 1 tablet (50 mg total) by mouth 2 (two) times daily. 60 tablet 5    famotidine 20 MG Oral Tab Take 1 tablet (20 mg total) by mouth nightly as needed.      fluticasone propionate 50 MCG/ACT Nasal Suspension 2 sprays by Nasal route daily.      Coenzyme Q10 (COQ10) 200 MG Oral Cap       Wheat Dextrin (BENEFIBER OR) 2 g.      Multiple Vitamins-Minerals (WOMENS MULTIVITAMIN) Oral Tab as directed Orally       Allergies:Allergies[1]  PHYSICAL EXAM:   Physical Exam    General Appearance: Well nourished, well developed, no apparent distress.     HEENT: Normocephalic and atraumatic. Normal sclera.      Mental Status Exam: Patient is awake, alert and oriented to person, place and time with normal memory, fund of knowledge, attention/concentration and language.    Cranial Nerves:  funduscopic exam is normal  II: Visual fields: normal to confrontation test  III: Pupils:  equal, round, reactive to light, NO APD  III,IV,VI: Normal EOM. Saccades  V: Facial sensation: intact  VII: Facial strength: Normal  VIII: Hearing: intact  IX: Palate elevates symmetrically      Motor Exam: Normal tone. Strength is  5 out of 5 in proximal and distal muscles of upper and lower extremities  DTR: 2+ and symmetric. Downgoing toes bilaterally    Sensory: Normal sensation to superficial touch in all extremities    Coordination: Normal finger-nose-finger test and heel-to-shin test.  Normal finger chasing test    Gait: Stands up and walk unassisted, she could tandem gait for more than 5 steps without problems    TESTS/IMAGING:         ASSESSMENT/PLAN:   Migraines:    She has tried Topamax now she takes 50 mg twice daily, she does believe the medication has helped with her headaches however she has been experiencing drowsiness throughout the day and tingling in her fingertips that she is attributing to Topamax.  At times it appears that the side effects were not tolerable for her.  We discussed alternative strategies to treat her headaches, I suggested propranolol 20 mg 3 times daily as a preventative therapy, she will start this week and start weaning off the Topamax on Monday 3/16 .   Pulsatile tinnitus  I told her based on my assessment I do not think she has idiopathic intracranial hypertension, I am not confident why she has pulsatile tinnitus at this stage, she will give the propranolol about a week to see if it helps with the pulsatile tinnitus and if it does not she will follow-up with ENT    Thank you for allowing us to participate in your patient's care.        Meds This Visit:  Requested Prescriptions     Signed Prescriptions Disp Refills    propranolol 20 MG Oral Tab 90 tablet 3     Sig: Take 1 tablet (20 mg total) by mouth 3 (three) times daily.       Imaging & Referrals:  None     ID#[id number]       [1]   Allergies  Allergen Reactions    Nsaids OTHER (SEE COMMENTS)     Patient cannot take any Nsaids  due to her gastric bypass surgery 02/2024.    Adhesive Tape RASH

## 2025-03-29 ENCOUNTER — PATIENT MESSAGE (OUTPATIENT)
Dept: NEUROLOGY | Facility: CLINIC | Age: 40
End: 2025-03-29

## 2025-04-02 RX ORDER — AMITRIPTYLINE HYDROCHLORIDE 10 MG/1
10 TABLET ORAL NIGHTLY
Qty: 30 TABLET | Refills: 3 | Status: SHIPPED | OUTPATIENT
Start: 2025-04-02

## 2025-04-03 NOTE — TELEPHONE ENCOUNTER
Please let patient know I sent a prescription for amitriptyline 10 mg at bedtime. She can stop propranolol without tapering. She should allow 2 weeks for the medication to work

## 2025-07-29 ENCOUNTER — WALK IN (OUTPATIENT)
Dept: URGENT CARE | Age: 40
End: 2025-07-29

## 2025-07-29 VITALS
OXYGEN SATURATION: 97 % | BODY MASS INDEX: 43.43 KG/M2 | RESPIRATION RATE: 18 BRPM | HEIGHT: 62 IN | WEIGHT: 236 LBS | HEART RATE: 81 BPM | DIASTOLIC BLOOD PRESSURE: 67 MMHG | TEMPERATURE: 97.8 F | SYSTOLIC BLOOD PRESSURE: 104 MMHG

## 2025-07-29 DIAGNOSIS — L56.8 PHOTODERMATITIS DUE TO SUN: Primary | ICD-10-CM

## 2025-08-10 ASSESSMENT — ENCOUNTER SYMPTOMS
NEUROLOGICAL NEGATIVE: 1
ALLERGIC/IMMUNOLOGIC NEGATIVE: 1
POLYPHAGIA: 0
ABDOMINAL DISTENTION: 0
BRUISES/BLEEDS EASILY: 0
CHILLS: 0
EYE DISCHARGE: 0
PHOTOPHOBIA: 0
FACIAL ASYMMETRY: 0
RECTAL PAIN: 0
BACK PAIN: 0
POLYDIPSIA: 0
WEAKNESS: 0
NAUSEA: 0
TREMORS: 0
APPETITE CHANGE: 0
EYE REDNESS: 0
EYE PAIN: 0
SINUS PAIN: 0
NUMBNESS: 0
SORE THROAT: 0
CONSTITUTIONAL NEGATIVE: 1
VOMITING: 0
DIAPHORESIS: 0
ADENOPATHY: 0
WHEEZING: 0
HEADACHES: 0
COUGH: 0
SEIZURES: 0
SLEEP DISTURBANCE: 0
PSYCHIATRIC NEGATIVE: 1
HALLUCINATIONS: 0
FATIGUE: 0
SPEECH DIFFICULTY: 0
BLOOD IN STOOL: 0
EYES NEGATIVE: 1
SHORTNESS OF BREATH: 0
ACTIVITY CHANGE: 0
RHINORRHEA: 0
DIZZINESS: 0
GASTROINTESTINAL NEGATIVE: 1
RESPIRATORY NEGATIVE: 1
HEMATOLOGIC/LYMPHATIC NEGATIVE: 1
AGITATION: 0
UNEXPECTED WEIGHT CHANGE: 0
CONSTIPATION: 0
NERVOUS/ANXIOUS: 0
FEVER: 0
LIGHT-HEADEDNESS: 0
ENDOCRINE NEGATIVE: 1
DIARRHEA: 0
CONFUSION: 0
ABDOMINAL PAIN: 0

## 2025-08-20 ENCOUNTER — WALK IN (OUTPATIENT)
Dept: URGENT CARE | Age: 40
End: 2025-08-20

## 2025-08-20 VITALS
RESPIRATION RATE: 18 BRPM | SYSTOLIC BLOOD PRESSURE: 112 MMHG | WEIGHT: 239 LBS | HEART RATE: 80 BPM | TEMPERATURE: 98.8 F | DIASTOLIC BLOOD PRESSURE: 69 MMHG | OXYGEN SATURATION: 98 % | BODY MASS INDEX: 43.98 KG/M2 | HEIGHT: 62 IN

## 2025-08-20 DIAGNOSIS — J01.90 ACUTE NON-RECURRENT SINUSITIS, UNSPECIFIED LOCATION: ICD-10-CM

## 2025-08-20 DIAGNOSIS — R05.9 COUGH, UNSPECIFIED TYPE: Primary | ICD-10-CM

## 2025-08-20 LAB
FLUAV RNA RESP QL NAA+PROBE: NOT DETECTED
FLUBV RNA RESP QL NAA+PROBE: NOT DETECTED
RSV AG NPH QL IA.RAPID: NOT DETECTED
S PYO DNA THROAT QL NAA+PROBE: NOT DETECTED
SARS-COV-2 RNA RESP QL NAA+PROBE: NOT DETECTED
TEST LOT EXPIRATION DATE: 0
TEST LOT EXPIRATION DATE: 0
TEST LOT NUMBER: 0
TEST LOT NUMBER: 0

## 2025-08-23 ENCOUNTER — WALK IN (OUTPATIENT)
Dept: URGENT CARE | Age: 40
End: 2025-08-23

## 2025-08-23 VITALS
OXYGEN SATURATION: 98 % | TEMPERATURE: 97.9 F | HEIGHT: 62 IN | RESPIRATION RATE: 18 BRPM | BODY MASS INDEX: 43.61 KG/M2 | DIASTOLIC BLOOD PRESSURE: 73 MMHG | HEART RATE: 75 BPM | SYSTOLIC BLOOD PRESSURE: 107 MMHG | WEIGHT: 236.99 LBS

## 2025-08-23 DIAGNOSIS — J01.90 ACUTE RHINOSINUSITIS: ICD-10-CM

## 2025-08-23 DIAGNOSIS — H57.89 IRRITATION OF RIGHT EYE: Primary | ICD-10-CM

## 2025-08-23 RX ORDER — ERYTHROMYCIN 5 MG/G
OINTMENT OPHTHALMIC EVERY 6 HOURS
Qty: 3.5 G | Refills: 0 | Status: SHIPPED | OUTPATIENT
Start: 2025-08-23 | End: 2025-08-28

## 2025-08-23 ASSESSMENT — ENCOUNTER SYMPTOMS
DIZZINESS: 0
FACIAL SWELLING: 0
ABDOMINAL PAIN: 0
EYE REDNESS: 1
FOREIGN BODY SENSATION: 0
CHOKING: 0
NAUSEA: 0
VOICE CHANGE: 0
PSYCHIATRIC NEGATIVE: 1
WEAKNESS: 0
SORE THROAT: 0
TROUBLE SWALLOWING: 0
EYE DISCHARGE: 1
COLOR CHANGE: 0
CHEST TIGHTNESS: 0
APPETITE CHANGE: 0
PHOTOPHOBIA: 0
LIGHT-HEADEDNESS: 0
DIAPHORESIS: 0
DIARRHEA: 0
TREMORS: 0
EYE PAIN: 0
FATIGUE: 0
STRIDOR: 0
ACTIVITY CHANGE: 0
RHINORRHEA: 0
BLURRED VISION: 0
BLOOD IN STOOL: 0
VOMITING: 0
BACK PAIN: 0
SINUS PAIN: 0
CHILLS: 0
NUMBNESS: 0
APNEA: 0
FACIAL ASYMMETRY: 0
SPEECH DIFFICULTY: 0
WOUND: 0
ANAL BLEEDING: 0
SHORTNESS OF BREATH: 0
HEMATOLOGIC/LYMPHATIC NEGATIVE: 1
SEIZURES: 0
CONSTIPATION: 0
FEVER: 0
EYE ITCHING: 0
SINUS PRESSURE: 1
HEADACHES: 0
COUGH: 1
ABDOMINAL DISTENTION: 0
DOUBLE VISION: 0
WHEEZING: 0

## 2025-08-23 ASSESSMENT — VISUAL ACUITY
OU: 1
OD_CC: 20/25
OS_CC: 20/25

## (undated) NOTE — ED AVS SNAPSHOT
[facility] in [place] [address]    Phone:  Brandyn Ferraraerey Omid   MRN: [id number]    Department:  [facility] in [facility]   Date of Visit:  3/13/2017 may not be covered by your plan. It is possible that the physician may not participate in your health insurance plan. This may result in a lower benefit level being available to you or other limited reimbursement.   The physician may seek payment directly If you have been prescribed any medication(s), please fill your prescription right away and begin taking the medication(s) as directed.   If you believe that any of the medications or instructions on this list is different from what your Primary Care doctor harming yourself, contact [facility] at [phone]. - If you don’t have insurance, [facility] has partnered with [facility] [address] to help you get signed up for insurance coverage.   [facility]